# Patient Record
Sex: FEMALE | Race: WHITE | HISPANIC OR LATINO | Employment: UNEMPLOYED | URBAN - NONMETROPOLITAN AREA
[De-identification: names, ages, dates, MRNs, and addresses within clinical notes are randomized per-mention and may not be internally consistent; named-entity substitution may affect disease eponyms.]

---
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
## 2021-01-07 ENCOUNTER — ROUTINE PRENATAL (OUTPATIENT)
Dept: OBSTETRICS AND GYNECOLOGY | Facility: CLINIC | Age: 21
End: 2021-01-07

## 2021-01-07 VITALS — WEIGHT: 163 LBS | BODY MASS INDEX: 28.87 KG/M2 | DIASTOLIC BLOOD PRESSURE: 70 MMHG | SYSTOLIC BLOOD PRESSURE: 112 MMHG

## 2021-01-07 DIAGNOSIS — Z34.83 ENCOUNTER FOR SUPERVISION OF OTHER NORMAL PREGNANCY IN THIRD TRIMESTER: Primary | ICD-10-CM

## 2021-01-07 DIAGNOSIS — Z36.85 ANTENATAL SCREENING FOR STREPTOCOCCUS B: ICD-10-CM

## 2021-01-07 PROBLEM — O09.893 HIGH RISK TEEN PREGNANCY IN THIRD TRIMESTER: Status: ACTIVE | Noted: 2020-11-04

## 2021-01-07 PROCEDURE — 99212 OFFICE O/P EST SF 10 MIN: CPT | Performed by: MIDWIFE

## 2021-01-07 NOTE — PROGRESS NOTES
Chief Complaint   Patient presents with   • Routine Prenatal Visit     Patient c/o left side cramping in arm and leg for 2 weeks       HPI: Meg is a  currently at 36w0d who today reports the following:  Contractions - No; Leaking - No; Vaginal bleeding -  No; Swelling of extremities - No; Baby is active - YES   She has had some tingling sensations for brief periods in her left hand and foot. It usually resolves on its own after moving. She denies any neck pain or headaches.    ROS:   GI:   Nausea - No; Constipation - No   Neuro:  Headache - No; Visual disturbances - No.    Social History    Tobacco Use      Smoking status: Never Smoker      Smokeless tobacco: Never Used      EXAM:   Vitals:  See prenatal flowsheet, /70, Wt +3#   Abdomen:   See prenatal flowsheet as noted and reviewed, soft, nontender   Pelvic:  See prenatal flowsheet   Urine:  See prenatal flowsheet as noted and reviewed    MDM:  Impression: Supervision of low risk pregnancy   Tests done today: GBS testing   Topics discussed: kick counts and fetal movement  labor signs and symptoms   Tests next visit: none   Next visit: 1 week     This note was electronically signed.  America Michelle, APRN  2021

## 2021-01-09 LAB — GP B STREP DNA SPEC QL NAA+PROBE: NEGATIVE

## 2021-01-14 ENCOUNTER — ROUTINE PRENATAL (OUTPATIENT)
Dept: OBSTETRICS AND GYNECOLOGY | Facility: CLINIC | Age: 21
End: 2021-01-14

## 2021-01-14 VITALS — SYSTOLIC BLOOD PRESSURE: 116 MMHG | BODY MASS INDEX: 29.41 KG/M2 | WEIGHT: 166 LBS | DIASTOLIC BLOOD PRESSURE: 60 MMHG

## 2021-01-14 DIAGNOSIS — O09.93 ENCOUNTER FOR SUPERVISION OF HIGH RISK PREGNANCY IN THIRD TRIMESTER, ANTEPARTUM: Primary | ICD-10-CM

## 2021-01-14 DIAGNOSIS — O09.893 HIGH RISK TEEN PREGNANCY IN THIRD TRIMESTER: ICD-10-CM

## 2021-01-14 DIAGNOSIS — O47.9 IRREGULAR UTERINE CONTRACTIONS: ICD-10-CM

## 2021-01-14 PROCEDURE — 99213 OFFICE O/P EST LOW 20 MIN: CPT | Performed by: OBSTETRICS & GYNECOLOGY

## 2021-01-15 NOTE — PROGRESS NOTES
Chief Complaint  Routine Prenatal Visit (Patient complains of pressure. )    History of Present Illness:  Meg is a  currently at 37w0d who presents today with complaints of increasing pressure.  Patient reports having occasional contractions.  She denies any vaginal bleeding or spotting.  Patient denies any leaking of fluid.  She does report good fetal movement.  Patient did have her group B strep last visit.    Exam:  Vitals:  See prenatal flowsheet as noted and reviewed  General: Alert, cooperative, and does not appear in any distress  Abdomen:   See prenatal flowsheet as noted and reviewed    Uterus gravid, non-tender; no palpable masses    No guarding or rebound tenderness  Pelvic:  See prenatal flowsheet as noted and reviewed  Ext:  See prenatal flowsheet as noted and reviewed    Moves extremities well, no cyanosis and no redness  Urine:  See prenatal flowsheet as noted and reviewed    Data Review:  The following data was reviewed by: Divya Mena MD on 2021:  Prenatal Labs:  Lab Results   Component Value Date    HGB 12.0 2020    RUBELLAABIGG <0.90 (L) 2020    HEPBSAG Negative 2020    ABO O 2020    RH Positive 2020    ABSCRN See Final Results 2020    ABSCRN Comment 2020    [id number] Non Reactive 2020    HEPCVIRUSABY <0.1 2020    STREPGPB Negative 2021    URINECX Final report 2020       Routine Prenatal on 2021   Component Date Value   • Strep Gp B ANDERSON 2021 Negative      Imaging:  US Ob Follow Up Transabdominal Approach  Impression:   IUP at 32+0 weeks. Limited anatomy was reviewed today and is normal in   appearance. EFW 1875g(38%) AC 50%. Cephalic presentation. Placenta is   anterior. Amniotic fluid and fetal heart rate are normal.    Nba Call MD  Obstetrics and Gynecology  [facility]    Medical Records:  None    Assessment and Plan:  Problem List Items Addressed This Visit        Other    High  risk teen pregnancy in third trimester      Other Visit Diagnoses     Encounter for supervision of high risk pregnancy in third trimester, antepartum    -  Primary  Topics discussed:     induction of labor  kick counts and fetal movement  labor signs and symptoms  PIH precautions      Irregular uterine contractions      Patient with irregular uterine contractions as noted.  Labor instructions and precautions have been given.  Patient is to follow-up in 1 week.  Patient has been informed regarding her GBS.        Follow Up/Instructions:    Patient was given instructions and counseling regarding her condition or for health maintenance advice. Please see specific information pulled into the AVS if appropriate.     Note: Speech recognition transcription software may have been used to dictate portions of this document.  An attempt at proofreading has been made though minor errors in transcription may still be present.    This note was electronically signed.  Divya Mena M.D.

## 2021-01-18 ENCOUNTER — HOSPITAL ENCOUNTER (OUTPATIENT)
Facility: HOSPITAL | Age: 21
Discharge: HOME | End: 2021-01-18
Attending: MIDWIFE | Admitting: MIDWIFE

## 2021-01-18 VITALS
HEART RATE: 101 BPM | RESPIRATION RATE: 16 BRPM | OXYGEN SATURATION: 99 % | WEIGHT: 166 LBS | HEIGHT: 63 IN | TEMPERATURE: 98.8 F | BODY MASS INDEX: 29.41 KG/M2

## 2021-01-18 LAB
BILIRUB BLD-MCNC: NEGATIVE MG/DL
CLARITY, POC: CLEAR
COLOR UR: YELLOW
GLUCOSE UR STRIP-MCNC: NEGATIVE MG/DL
KETONES UR QL: NEGATIVE
LEUKOCYTE EST, POC: NEGATIVE
NITRITE UR-MCNC: NEGATIVE MG/ML
PH UR: 7 [PH] (ref 5–8)
PROT UR STRIP-MCNC: NEGATIVE MG/DL
RBC # UR STRIP: ABNORMAL /UL
SP GR UR: 1.01 (ref 1–1.03)
UROBILINOGEN UR QL: NORMAL

## 2021-01-18 PROCEDURE — 81002 URINALYSIS NONAUTO W/O SCOPE: CPT | Performed by: MIDWIFE

## 2021-01-18 PROCEDURE — 59025 FETAL NON-STRESS TEST: CPT | Performed by: MIDWIFE

## 2021-01-18 PROCEDURE — 59025 FETAL NON-STRESS TEST: CPT

## 2021-01-18 PROCEDURE — G0463 HOSPITAL OUTPT CLINIC VISIT: HCPCS

## 2021-01-19 ENCOUNTER — HOSPITAL ENCOUNTER (OUTPATIENT)
Facility: HOSPITAL | Age: 21
Discharge: HOME | End: 2021-01-19
Attending: NURSE PRACTITIONER | Admitting: NURSE PRACTITIONER

## 2021-01-19 VITALS
TEMPERATURE: 98.7 F | HEIGHT: 63 IN | HEART RATE: 93 BPM | SYSTOLIC BLOOD PRESSURE: 126 MMHG | BODY MASS INDEX: 29.41 KG/M2 | WEIGHT: 166 LBS | OXYGEN SATURATION: 100 % | DIASTOLIC BLOOD PRESSURE: 75 MMHG | RESPIRATION RATE: 18 BRPM

## 2021-01-19 LAB
BILIRUB BLD-MCNC: NEGATIVE MG/DL
CLARITY, POC: CLEAR
COLOR UR: YELLOW
GLUCOSE UR STRIP-MCNC: NEGATIVE MG/DL
KETONES UR QL: NEGATIVE
LEUKOCYTE EST, POC: NEGATIVE
NITRITE UR-MCNC: NEGATIVE MG/ML
PH UR: 7 [PH] (ref 5–8)
PROT UR STRIP-MCNC: ABNORMAL MG/DL
RBC # UR STRIP: ABNORMAL /UL
SP GR UR: 1.02 (ref 1–1.03)
UROBILINOGEN UR QL: NORMAL

## 2021-01-19 PROCEDURE — 81002 URINALYSIS NONAUTO W/O SCOPE: CPT | Performed by: NURSE PRACTITIONER

## 2021-01-19 PROCEDURE — 99213 OFFICE O/P EST LOW 20 MIN: CPT | Performed by: NURSE PRACTITIONER

## 2021-01-19 PROCEDURE — 59025 FETAL NON-STRESS TEST: CPT | Performed by: NURSE PRACTITIONER

## 2021-01-19 PROCEDURE — 59025 FETAL NON-STRESS TEST: CPT

## 2021-01-19 PROCEDURE — G0463 HOSPITAL OUTPT CLINIC VISIT: HCPCS

## 2021-01-19 NOTE — PROGRESS NOTES
"  : 2000  MRN: [id number]  CSN: [id number]    Chief Complaint:  Contractions   History and Physical    Subjective   Meg Mccrary is a 20 y.o. year old  with an Estimated Date of Delivery: 21 currently at 37w5d presenting with stating she was having some contractions - uncertain how frequent.   She denies vaginal bleeding or fluid leaking.  She also didn't feel she was feeling FM but does feel the baby move now  .    Upon arrival she was checked KIRIT Becker RN - 2/50%/-2    She was evaluated x 1 1/2 hrs.       OB History    Para Term  AB Living   2 1 1 0 0 1   SAB TAB Ectopic Molar Multiple Live Births   0 0 0 0 0 1      # Outcome Date GA Lbr Chalo/2nd Weight Sex Delivery Anes PTL Lv   2 Current            1 Term 18 39w2d 04:58 / 00:38 3147 g (6 lb 15 oz) F Vag-Spont None N ROSA      Name: MARTHA BAIN      Apgar1: 8  Apgar5: 9     Past Medical History:   Diagnosis Date   • Rubella non-immune status, antepartum 2020   • Urinary tract infection      No past surgical history on file.  No current facility-administered medications for this encounter.     Current Outpatient Medications:   •  influenza vac split quad (Flulaval Quadrivalent) 0.5 ML suspension prefilled syringe injection, Inject  into the appropriate muscle as directed by prescriber., Disp: 0.5 mL, Rfl: 0  •  Prenatal Vit-Fe Fumarate-FA (PRENATAL VITAMIN 28-0.8) 28-0.8 MG tablet tablet, Take 1 tablet by mouth Daily., Disp: 90 tablet, Rfl: 3    Allergies   Allergen Reactions   • Fish-Derived Products Rash     Social History    Tobacco Use      Smoking status: Never Smoker      Smokeless tobacco: Never Used      Review of Systems:  Pertinent items are noted in HPI, all other systems reviewed and negative        Objective   /75   Pulse 93   Temp 98.7 °F (37.1 °C) (Oral)   Resp 18   Ht 160 cm (63\")   Wt 75.3 kg (166 lb)   LMP 2020 (Approximate)   SpO2 100%   BMI 29.41 kg/m² "     Psych: Altert and oriented to time, place and person  Mood and affect appropriate   General: well developed; well nourished  no acute distress  Lungs:  breathing is unlabored  Abdomen: Gravid - soft and non-tender   Difficult to evaluate contractions per EFM -  + accels and variability   Lower Extremities: Neg edema and DTR's 2+   V/E: 2 /50%/-2 /cephalic cx post - no fluid leaking - no bloody show      Prenatal Labs  Lab Results   Component Value Date    HGB 12.0 11/04/2020    HEPBSAG Negative 06/25/2020    ABSCRN See Final Results 06/25/2020    ABSCRN Comment 06/25/2020    [id number] Non Reactive 06/25/2020    HEPCVIRUSABY <0.1 06/25/2020    STREPGPB Negative 01/07/2021    URINECX Final report 07/27/2020       Current Labs Reviewed   No data reviewed         Assessment   1. IUP at 37w5d  2. BH's contractions vs early labor  3. NST reactive        Plan   1. We discussed option to ambulate and sánchez in 1 hr   2. Reviewed S/S labor and adequate FM / kick counts and importance adequate FM   3. Desires d/c [place] - [facility] has an appt if office on Thursday    Fawn Clark CNM  1/19/2021  16:16 EST

## 2021-01-19 NOTE — NON STRESS TEST
Triage Note - Nursing Documentation  Labor and Delivery Admission Log    Meg Mccrary  : 2000  MRN: [id number]  CSN: [id number]    Date in / Time in:  2021  Time in: 1355    Date out / Time out:    Time out: 3709    Nurse: Lorrie Becker RN    Patient Info: She is a 20 y.o. year old  at 37w5d with an LEIDY of 2021, by Ultrasound who was seen on the [facility] [place].    Chief Complaint:   Chief Complaint   Patient presents with   • Decreased Fetal Movement   • Contractions       Provider Instructions / Disposition: To [place] with c/o contractions and decreased fetal movement. REACTIVE NST, fetal movement heard on monitor, palpated. CHUCKY ramos gave chose to leave and come back if worse or walk here and recheck later. Chose home. VE unchanged from yesterday . Irregular contraction pattern. Follow up in office on Thursday as scheduled    Patient Active Problem List   Diagnosis   • Rubella non-immune status, antepartum   • High risk teen pregnancy in third trimester       NST Documentation (Only applicable > 32 weeks): Interpretation A  Nonstress Test Interpretation A: Reactive (21 1548 : Lorrie Becker, RN)

## 2021-01-19 NOTE — NURSING NOTE
PT with c/o decreased fm, less than usual. EFM applied FM felt upon attachment twice. C/O contractions, VE 2/50/-2 white discharge on glove with exam

## 2021-01-21 ENCOUNTER — ROUTINE PRENATAL (OUTPATIENT)
Dept: OBSTETRICS AND GYNECOLOGY | Facility: CLINIC | Age: 21
End: 2021-01-21

## 2021-01-21 VITALS — DIASTOLIC BLOOD PRESSURE: 68 MMHG | WEIGHT: 166 LBS | SYSTOLIC BLOOD PRESSURE: 112 MMHG | BODY MASS INDEX: 29.41 KG/M2

## 2021-01-21 DIAGNOSIS — Z28.39 RUBELLA NON-IMMUNE STATUS, ANTEPARTUM: ICD-10-CM

## 2021-01-21 DIAGNOSIS — O09.893 HIGH RISK TEEN PREGNANCY IN THIRD TRIMESTER: Primary | ICD-10-CM

## 2021-01-21 DIAGNOSIS — O09.899 RUBELLA NON-IMMUNE STATUS, ANTEPARTUM: ICD-10-CM

## 2021-01-21 PROCEDURE — 99212 OFFICE O/P EST SF 10 MIN: CPT | Performed by: MIDWIFE

## 2021-01-21 NOTE — PROGRESS NOTES
Chief Complaint   Patient presents with   • Routine Prenatal Visit     Patient c/o nausea, pelvic pressure, lower abdominal and back pain       HPI: Meg is a  currently at 38w0d who today reports the following:  Contractions - YES - she went to  on Monday and Tuesday for labor check.; Swelling of extremities - No; Baby is active - YES    ROS:   Neuro:  Headache - No; Visual disturbances - No.    Social History    Tobacco Use      Smoking status: Never Smoker      Smokeless tobacco: Never Used      EXAM:   Vitals:  See prenatal flowsheet, /68, Wt no change   Abdomen:   See prenatal flowsheet as noted and reviewed, soft, nontender   Pelvic:  See prenatal flowsheet, tight 3/70/-2   Urine:  See prenatal flowsheet as noted and reviewed    MDM:  Impression: Supervision of low risk pregnancy   Tests done today: none   Topics discussed: kick counts and fetal movement  labor signs and symptoms   Tests next visit: none   Next visit: 1 week     This note was electronically signed.  America Michelle, APRN  2021

## 2021-01-25 ENCOUNTER — HOSPITAL ENCOUNTER (INPATIENT)
Facility: HOSPITAL | Age: 21
LOS: 2 days | Discharge: HOME | End: 2021-01-27
Attending: OBSTETRICS & GYNECOLOGY | Admitting: OBSTETRICS & GYNECOLOGY

## 2021-01-25 PROBLEM — Z34.90 PREGNANCY: Status: ACTIVE | Noted: 2021-01-25

## 2021-01-25 LAB
ABO GROUP BLD: NORMAL
BASOPHILS # BLD AUTO: 0.05 10*3/MM3 (ref 0–0.2)
BASOPHILS NFR BLD AUTO: 0.5 % (ref 0–1.5)
BILIRUB BLD-MCNC: NEGATIVE MG/DL
BLD GP AB SCN SERPL QL: NEGATIVE
CLARITY, POC: ABNORMAL
COLOR UR: YELLOW
DEPRECATED RDW RBC AUTO: 43.3 FL (ref 37–54)
EOSINOPHIL # BLD AUTO: 0.09 10*3/MM3 (ref 0–0.4)
EOSINOPHIL NFR BLD AUTO: 0.9 % (ref 0.3–6.2)
ERYTHROCYTE [DISTWIDTH] IN BLOOD BY AUTOMATED COUNT: 14.1 % (ref 12.3–15.4)
GLUCOSE UR STRIP-MCNC: NEGATIVE MG/DL
HCT VFR BLD AUTO: 39.7 % (ref 34–46.6)
HGB BLD-MCNC: 12.9 G/DL (ref 12–15.9)
IMM GRANULOCYTES # BLD AUTO: 0.17 10*3/MM3 (ref 0–0.05)
IMM GRANULOCYTES NFR BLD AUTO: 1.7 % (ref 0–0.5)
KETONES UR QL: NEGATIVE
LEUKOCYTE EST, POC: NEGATIVE
LYMPHOCYTES # BLD AUTO: 2.68 10*3/MM3 (ref 0.7–3.1)
LYMPHOCYTES NFR BLD AUTO: 27.2 % (ref 19.6–45.3)
MCH RBC QN AUTO: 27.7 PG (ref 26.6–33)
MCHC RBC AUTO-ENTMCNC: 32.5 G/DL (ref 31.5–35.7)
MCV RBC AUTO: 85.4 FL (ref 79–97)
MONOCYTES # BLD AUTO: 1.07 10*3/MM3 (ref 0.1–0.9)
MONOCYTES NFR BLD AUTO: 10.9 % (ref 5–12)
NEUTROPHILS NFR BLD AUTO: 5.79 10*3/MM3 (ref 1.7–7)
NEUTROPHILS NFR BLD AUTO: 58.8 % (ref 42.7–76)
NITRITE UR-MCNC: NEGATIVE MG/ML
NRBC BLD AUTO-RTO: 0 /100 WBC (ref 0–0.2)
PH UR: 8 [PH] (ref 5–8)
PLATELET # BLD AUTO: 285 10*3/MM3 (ref 140–450)
PMV BLD AUTO: 10.7 FL (ref 6–12)
PROT UR STRIP-MCNC: NEGATIVE MG/DL
RBC # BLD AUTO: 4.65 10*6/MM3 (ref 3.77–5.28)
RBC # UR STRIP: NEGATIVE /UL
RH BLD: POSITIVE
SP GR UR: 1.01 (ref 1–1.03)
T&S EXPIRATION DATE: NORMAL
UROBILINOGEN UR QL: NORMAL
WBC # BLD AUTO: 9.85 10*3/MM3 (ref 3.4–10.8)

## 2021-01-25 PROCEDURE — 86900 BLOOD TYPING SEROLOGIC ABO: CPT | Performed by: OBSTETRICS & GYNECOLOGY

## 2021-01-25 PROCEDURE — 25010000002 METHYLERGONOVINE MALEATE PER 0.2 MG: Performed by: OBSTETRICS & GYNECOLOGY

## 2021-01-25 PROCEDURE — 59410 OBSTETRICAL CARE: CPT | Performed by: OBSTETRICS & GYNECOLOGY

## 2021-01-25 PROCEDURE — 85025 COMPLETE CBC W/AUTO DIFF WBC: CPT | Performed by: OBSTETRICS & GYNECOLOGY

## 2021-01-25 PROCEDURE — 81002 URINALYSIS NONAUTO W/O SCOPE: CPT | Performed by: OBSTETRICS & GYNECOLOGY

## 2021-01-25 PROCEDURE — 86901 BLOOD TYPING SEROLOGIC RH(D): CPT | Performed by: OBSTETRICS & GYNECOLOGY

## 2021-01-25 PROCEDURE — 25010000002 MORPHINE PER 10 MG: Performed by: OBSTETRICS & GYNECOLOGY

## 2021-01-25 PROCEDURE — S0260 H&P FOR SURGERY: HCPCS | Performed by: OBSTETRICS & GYNECOLOGY

## 2021-01-25 PROCEDURE — 86850 RBC ANTIBODY SCREEN: CPT | Performed by: OBSTETRICS & GYNECOLOGY

## 2021-01-25 RX ORDER — SODIUM CHLORIDE 0.9 % (FLUSH) 0.9 %
1-10 SYRINGE (ML) INJECTION AS NEEDED
Status: DISCONTINUED | OUTPATIENT
Start: 2021-01-25 | End: 2021-01-27 | Stop reason: HOSPADM

## 2021-01-25 RX ORDER — MORPHINE SULFATE 2 MG/ML
6 INJECTION, SOLUTION INTRAMUSCULAR; INTRAVENOUS EVERY 4 HOURS PRN
Status: DISCONTINUED | OUTPATIENT
Start: 2021-01-25 | End: 2021-01-25 | Stop reason: HOSPADM

## 2021-01-25 RX ORDER — ONDANSETRON 2 MG/ML
4 INJECTION INTRAMUSCULAR; INTRAVENOUS EVERY 6 HOURS PRN
Status: DISCONTINUED | OUTPATIENT
Start: 2021-01-25 | End: 2021-01-27 | Stop reason: HOSPADM

## 2021-01-25 RX ORDER — PROMETHAZINE HYDROCHLORIDE 12.5 MG/1
12.5 SUPPOSITORY RECTAL EVERY 6 HOURS PRN
Status: DISCONTINUED | OUTPATIENT
Start: 2021-01-25 | End: 2021-01-27 | Stop reason: HOSPADM

## 2021-01-25 RX ORDER — OXYTOCIN/0.9 % SODIUM CHLORIDE 30/500 ML
650 PLASTIC BAG, INJECTION (ML) INTRAVENOUS ONCE
Status: COMPLETED | OUTPATIENT
Start: 2021-01-25 | End: 2021-01-25

## 2021-01-25 RX ORDER — HYDROCODONE BITARTRATE AND ACETAMINOPHEN 5; 325 MG/1; MG/1
2 TABLET ORAL ONCE AS NEEDED
Status: DISCONTINUED | OUTPATIENT
Start: 2021-01-25 | End: 2021-01-25 | Stop reason: HOSPADM

## 2021-01-25 RX ORDER — MORPHINE SULFATE 2 MG/ML
2 INJECTION, SOLUTION INTRAMUSCULAR; INTRAVENOUS ONCE AS NEEDED
Status: DISCONTINUED | OUTPATIENT
Start: 2021-01-25 | End: 2021-01-25 | Stop reason: HOSPADM

## 2021-01-25 RX ORDER — ONDANSETRON 4 MG/1
4 TABLET, FILM COATED ORAL ONCE AS NEEDED
Status: DISCONTINUED | OUTPATIENT
Start: 2021-01-25 | End: 2021-01-25 | Stop reason: HOSPADM

## 2021-01-25 RX ORDER — HYDROCODONE BITARTRATE AND ACETAMINOPHEN 5; 325 MG/1; MG/1
1 TABLET ORAL EVERY 4 HOURS PRN
Status: DISCONTINUED | OUTPATIENT
Start: 2021-01-25 | End: 2021-01-27 | Stop reason: HOSPADM

## 2021-01-25 RX ORDER — SODIUM CHLORIDE 0.9 % (FLUSH) 0.9 %
3 SYRINGE (ML) INJECTION EVERY 12 HOURS SCHEDULED
Status: DISCONTINUED | OUTPATIENT
Start: 2021-01-25 | End: 2021-01-25 | Stop reason: HOSPADM

## 2021-01-25 RX ORDER — CARBOPROST TROMETHAMINE 250 UG/ML
250 INJECTION, SOLUTION INTRAMUSCULAR AS NEEDED
Status: DISCONTINUED | OUTPATIENT
Start: 2021-01-25 | End: 2021-01-25 | Stop reason: HOSPADM

## 2021-01-25 RX ORDER — METHYLERGONOVINE MALEATE 0.2 MG/ML
200 INJECTION INTRAVENOUS ONCE AS NEEDED
Status: COMPLETED | OUTPATIENT
Start: 2021-01-25 | End: 2021-01-25

## 2021-01-25 RX ORDER — DOCUSATE SODIUM 100 MG/1
100 CAPSULE, LIQUID FILLED ORAL 2 TIMES DAILY
Status: DISCONTINUED | OUTPATIENT
Start: 2021-01-25 | End: 2021-01-27 | Stop reason: HOSPADM

## 2021-01-25 RX ORDER — OXYTOCIN/0.9 % SODIUM CHLORIDE 30/500 ML
85 PLASTIC BAG, INJECTION (ML) INTRAVENOUS ONCE
Status: COMPLETED | OUTPATIENT
Start: 2021-01-25 | End: 2021-01-25

## 2021-01-25 RX ORDER — SODIUM CHLORIDE, SODIUM LACTATE, POTASSIUM CHLORIDE, CALCIUM CHLORIDE 600; 310; 30; 20 MG/100ML; MG/100ML; MG/100ML; MG/100ML
125 INJECTION, SOLUTION INTRAVENOUS CONTINUOUS
Status: DISCONTINUED | OUTPATIENT
Start: 2021-01-25 | End: 2021-01-25

## 2021-01-25 RX ORDER — HYDROCODONE BITARTRATE AND ACETAMINOPHEN 7.5; 325 MG/1; MG/1
1 TABLET ORAL EVERY 4 HOURS PRN
Status: DISCONTINUED | OUTPATIENT
Start: 2021-01-25 | End: 2021-01-27 | Stop reason: HOSPADM

## 2021-01-25 RX ORDER — LIDOCAINE HYDROCHLORIDE 10 MG/ML
5 INJECTION, SOLUTION EPIDURAL; INFILTRATION; INTRACAUDAL; PERINEURAL AS NEEDED
Status: DISCONTINUED | OUTPATIENT
Start: 2021-01-25 | End: 2021-01-25 | Stop reason: HOSPADM

## 2021-01-25 RX ORDER — ONDANSETRON 4 MG/1
4 TABLET, FILM COATED ORAL EVERY 8 HOURS PRN
Status: DISCONTINUED | OUTPATIENT
Start: 2021-01-25 | End: 2021-01-27 | Stop reason: HOSPADM

## 2021-01-25 RX ORDER — DIPHENHYDRAMINE HCL 25 MG
25 CAPSULE ORAL NIGHTLY PRN
Status: DISCONTINUED | OUTPATIENT
Start: 2021-01-25 | End: 2021-01-27 | Stop reason: HOSPADM

## 2021-01-25 RX ORDER — LANOLIN
CREAM (GRAM) TOPICAL
Status: DISCONTINUED | OUTPATIENT
Start: 2021-01-25 | End: 2021-01-27 | Stop reason: HOSPADM

## 2021-01-25 RX ORDER — PROMETHAZINE HYDROCHLORIDE 25 MG/1
25 TABLET ORAL EVERY 6 HOURS PRN
Status: DISCONTINUED | OUTPATIENT
Start: 2021-01-25 | End: 2021-01-27 | Stop reason: HOSPADM

## 2021-01-25 RX ORDER — PROMETHAZINE HYDROCHLORIDE 12.5 MG/1
12.5 SUPPOSITORY RECTAL EVERY 6 HOURS PRN
Status: DISCONTINUED | OUTPATIENT
Start: 2021-01-25 | End: 2021-01-25 | Stop reason: HOSPADM

## 2021-01-25 RX ORDER — PRENATAL VIT/IRON FUM/FOLIC AC 27MG-0.8MG
1 TABLET ORAL DAILY
Status: DISCONTINUED | OUTPATIENT
Start: 2021-01-25 | End: 2021-01-27 | Stop reason: HOSPADM

## 2021-01-25 RX ORDER — MISOPROSTOL 200 UG/1
800 TABLET ORAL AS NEEDED
Status: DISCONTINUED | OUTPATIENT
Start: 2021-01-25 | End: 2021-01-25 | Stop reason: HOSPADM

## 2021-01-25 RX ORDER — ACETAMINOPHEN 325 MG/1
650 TABLET ORAL ONCE AS NEEDED
Status: DISCONTINUED | OUTPATIENT
Start: 2021-01-25 | End: 2021-01-25 | Stop reason: HOSPADM

## 2021-01-25 RX ORDER — ONDANSETRON 2 MG/ML
4 INJECTION INTRAMUSCULAR; INTRAVENOUS ONCE AS NEEDED
Status: DISCONTINUED | OUTPATIENT
Start: 2021-01-25 | End: 2021-01-25 | Stop reason: HOSPADM

## 2021-01-25 RX ORDER — PROMETHAZINE HYDROCHLORIDE 12.5 MG/1
12.5 TABLET ORAL EVERY 6 HOURS PRN
Status: DISCONTINUED | OUTPATIENT
Start: 2021-01-25 | End: 2021-01-25 | Stop reason: HOSPADM

## 2021-01-25 RX ORDER — MORPHINE SULFATE 4 MG/ML
4 INJECTION, SOLUTION INTRAMUSCULAR; INTRAVENOUS ONCE AS NEEDED
Status: DISCONTINUED | OUTPATIENT
Start: 2021-01-25 | End: 2021-01-25 | Stop reason: HOSPADM

## 2021-01-25 RX ORDER — HYDROCORTISONE 25 MG/G
1 CREAM TOPICAL AS NEEDED
Status: DISCONTINUED | OUTPATIENT
Start: 2021-01-25 | End: 2021-01-27 | Stop reason: HOSPADM

## 2021-01-25 RX ORDER — IBUPROFEN 600 MG/1
600 TABLET ORAL EVERY 6 HOURS PRN
Status: DISCONTINUED | OUTPATIENT
Start: 2021-01-25 | End: 2021-01-27 | Stop reason: HOSPADM

## 2021-01-25 RX ORDER — SODIUM CHLORIDE 0.9 % (FLUSH) 0.9 %
1-10 SYRINGE (ML) INJECTION AS NEEDED
Status: DISCONTINUED | OUTPATIENT
Start: 2021-01-25 | End: 2021-01-25 | Stop reason: HOSPADM

## 2021-01-25 RX ORDER — BISACODYL 10 MG
10 SUPPOSITORY, RECTAL RECTAL DAILY PRN
Status: DISCONTINUED | OUTPATIENT
Start: 2021-01-26 | End: 2021-01-27 | Stop reason: HOSPADM

## 2021-01-25 RX ORDER — MORPHINE SULFATE 4 MG/ML
4 INJECTION, SOLUTION INTRAMUSCULAR; INTRAVENOUS EVERY 4 HOURS PRN
Status: DISCONTINUED | OUTPATIENT
Start: 2021-01-25 | End: 2021-01-25 | Stop reason: HOSPADM

## 2021-01-25 RX ADMIN — Medication 650 ML/HR: at 06:41

## 2021-01-25 RX ADMIN — SODIUM CHLORIDE, POTASSIUM CHLORIDE, SODIUM LACTATE AND CALCIUM CHLORIDE 125 ML/HR: 600; 310; 30; 20 INJECTION, SOLUTION INTRAVENOUS at 03:50

## 2021-01-25 RX ADMIN — DOCUSATE SODIUM 100 MG: 100 CAPSULE ORAL at 10:54

## 2021-01-25 RX ADMIN — BENZOCAINE AND LEVOMENTHOL: 200; 5 SPRAY TOPICAL at 10:54

## 2021-01-25 RX ADMIN — PRENATAL VITAMINS-IRON FUMARATE 27 MG IRON-FOLIC ACID 0.8 MG TABLET 1 TABLET: at 10:54

## 2021-01-25 RX ADMIN — Medication 85 ML/HR: at 07:15

## 2021-01-25 RX ADMIN — MORPHINE SULFATE 4 MG: 4 INJECTION, SOLUTION INTRAMUSCULAR; INTRAVENOUS at 03:59

## 2021-01-25 RX ADMIN — METHYLERGONOVINE MALEATE 200 MCG: 0.2 INJECTION, SOLUTION INTRAMUSCULAR; INTRAVENOUS at 08:56

## 2021-01-25 RX ADMIN — IBUPROFEN 600 MG: 600 TABLET ORAL at 17:42

## 2021-01-25 RX ADMIN — DOCUSATE SODIUM 100 MG: 100 CAPSULE ORAL at 21:01

## 2021-01-26 LAB
BASOPHILS # BLD AUTO: 0.05 10*3/MM3 (ref 0–0.2)
BASOPHILS NFR BLD AUTO: 0.4 % (ref 0–1.5)
DEPRECATED RDW RBC AUTO: 42.5 FL (ref 37–54)
EOSINOPHIL # BLD AUTO: 0.11 10*3/MM3 (ref 0–0.4)
EOSINOPHIL NFR BLD AUTO: 1 % (ref 0.3–6.2)
ERYTHROCYTE [DISTWIDTH] IN BLOOD BY AUTOMATED COUNT: 13.9 % (ref 12.3–15.4)
HCT VFR BLD AUTO: 35.3 % (ref 34–46.6)
HGB BLD-MCNC: 11.4 G/DL (ref 12–15.9)
IMM GRANULOCYTES # BLD AUTO: 0.3 10*3/MM3 (ref 0–0.05)
IMM GRANULOCYTES NFR BLD AUTO: 2.7 % (ref 0–0.5)
LYMPHOCYTES # BLD AUTO: 3.17 10*3/MM3 (ref 0.7–3.1)
LYMPHOCYTES NFR BLD AUTO: 28.2 % (ref 19.6–45.3)
MCH RBC QN AUTO: 27.3 PG (ref 26.6–33)
MCHC RBC AUTO-ENTMCNC: 32.3 G/DL (ref 31.5–35.7)
MCV RBC AUTO: 84.7 FL (ref 79–97)
MONOCYTES # BLD AUTO: 1 10*3/MM3 (ref 0.1–0.9)
MONOCYTES NFR BLD AUTO: 8.9 % (ref 5–12)
NEUTROPHILS NFR BLD AUTO: 58.8 % (ref 42.7–76)
NEUTROPHILS NFR BLD AUTO: 6.61 10*3/MM3 (ref 1.7–7)
NRBC BLD AUTO-RTO: 0 /100 WBC (ref 0–0.2)
PLATELET # BLD AUTO: 251 10*3/MM3 (ref 140–450)
PMV BLD AUTO: 11 FL (ref 6–12)
RBC # BLD AUTO: 4.17 10*6/MM3 (ref 3.77–5.28)
WBC # BLD AUTO: 11.24 10*3/MM3 (ref 3.4–10.8)

## 2021-01-26 PROCEDURE — 85025 COMPLETE CBC W/AUTO DIFF WBC: CPT | Performed by: OBSTETRICS & GYNECOLOGY

## 2021-01-26 RX ADMIN — DOCUSATE SODIUM 100 MG: 100 CAPSULE ORAL at 08:30

## 2021-01-26 RX ADMIN — PRENATAL VITAMINS-IRON FUMARATE 27 MG IRON-FOLIC ACID 0.8 MG TABLET 1 TABLET: at 08:30

## 2021-01-26 RX ADMIN — IBUPROFEN 600 MG: 600 TABLET ORAL at 12:19

## 2021-01-26 RX ADMIN — IBUPROFEN 600 MG: 600 TABLET ORAL at 21:16

## 2021-01-26 RX ADMIN — DOCUSATE SODIUM 100 MG: 100 CAPSULE ORAL at 21:17

## 2021-01-27 VITALS
RESPIRATION RATE: 18 BRPM | WEIGHT: 166 LBS | HEIGHT: 63 IN | OXYGEN SATURATION: 97 % | BODY MASS INDEX: 29.41 KG/M2 | SYSTOLIC BLOOD PRESSURE: 101 MMHG | TEMPERATURE: 97.5 F | HEART RATE: 78 BPM | DIASTOLIC BLOOD PRESSURE: 53 MMHG

## 2021-01-27 PROCEDURE — 0503F POSTPARTUM CARE VISIT: CPT | Performed by: MIDWIFE

## 2021-01-27 RX ORDER — IBUPROFEN 600 MG/1
600 TABLET ORAL EVERY 6 HOURS PRN
Qty: 60 TABLET | Refills: 0 | Status: SHIPPED | OUTPATIENT
Start: 2021-01-27 | End: 2022-10-17

## 2021-01-27 RX ADMIN — DOCUSATE SODIUM 100 MG: 100 CAPSULE ORAL at 08:16

## 2021-01-27 RX ADMIN — PRENATAL VITAMINS-IRON FUMARATE 27 MG IRON-FOLIC ACID 0.8 MG TABLET 1 TABLET: at 08:16

## 2021-01-27 RX ADMIN — IBUPROFEN 600 MG: 600 TABLET ORAL at 08:16

## 2021-03-05 ENCOUNTER — POSTPARTUM VISIT (OUTPATIENT)
Dept: OBSTETRICS AND GYNECOLOGY | Facility: CLINIC | Age: 21
End: 2021-03-05

## 2021-03-05 VITALS
WEIGHT: 148.8 LBS | BODY MASS INDEX: 26.36 KG/M2 | SYSTOLIC BLOOD PRESSURE: 100 MMHG | DIASTOLIC BLOOD PRESSURE: 64 MMHG | HEIGHT: 63 IN

## 2021-03-05 DIAGNOSIS — Z30.011 ENCOUNTER FOR INITIAL PRESCRIPTION OF CONTRACEPTIVE PILLS: ICD-10-CM

## 2021-03-05 PROBLEM — Z34.90 PREGNANCY: Status: RESOLVED | Noted: 2021-01-25 | Resolved: 2021-03-05

## 2021-03-05 PROBLEM — Z28.39 RUBELLA NON-IMMUNE STATUS, ANTEPARTUM: Status: RESOLVED | Noted: 2020-06-25 | Resolved: 2021-03-05

## 2021-03-05 PROBLEM — O09.893 HIGH RISK TEEN PREGNANCY IN THIRD TRIMESTER: Status: RESOLVED | Noted: 2020-11-04 | Resolved: 2021-03-05

## 2021-03-05 PROBLEM — O09.899 RUBELLA NON-IMMUNE STATUS, ANTEPARTUM: Status: RESOLVED | Noted: 2020-06-25 | Resolved: 2021-03-05

## 2021-03-05 PROCEDURE — 0503F POSTPARTUM CARE VISIT: CPT | Performed by: MIDWIFE

## 2021-03-05 RX ORDER — ACETAMINOPHEN AND CODEINE PHOSPHATE 120; 12 MG/5ML; MG/5ML
1 SOLUTION ORAL DAILY
Qty: 28 TABLET | Refills: 12 | Status: SHIPPED | OUTPATIENT
Start: 2021-03-05 | End: 2021-03-05

## 2021-03-05 RX ORDER — ACETAMINOPHEN AND CODEINE PHOSPHATE 120; 12 MG/5ML; MG/5ML
1 SOLUTION ORAL DAILY
Qty: 28 TABLET | Refills: 12 | Status: SHIPPED | OUTPATIENT
Start: 2021-03-05 | End: 2021-05-07

## 2021-03-05 NOTE — PROGRESS NOTES
"Subjective   Chief Complaint   Patient presents with   • Follow-up     Patient is here for 6 week postpartum check.Wantine Nexplanon if Ins covers it or pills.     Meg Mccrary is a 20 y.o. year old  presenting to be seen for her postpartum visit.  She had a vaginal delivery.    Since delivery she has not been sexually active.   She does not have concerns about post-partum blues/depression.   She is Breast/Bottle Feeding: breast feeding .  For ongoing contraception, her plans are Nexplanon.  She has not had a menstrual cycle.    Social History    Tobacco Use      Smoking status: Never Smoker      Smokeless tobacco: Never Used         Objective   /64   Ht 160 cm (62.99\")   Wt 67.5 kg (148 lb 12.8 oz)   LMP 2020 (Approximate)   Breastfeeding Yes   BMI 26.37 kg/m²     General:  well developed; well nourished  no acute distress   Abdomen: soft, non-tender; no masses  no umbilical or inguinal hernias are present   Pelvis: External genitalia:  normal appearance of the external genitalia including Bartholin's and [place]'s glands.  Uterus:  normal size, shape and consistency.  Adnexa:  normal bimanual exam of the adnexa.        Assessment   1. Normal 6 week postpartum exam  2. Contraceptive management     Plan   1. BC options reviewed and compared today: Nexplanon and OCP (progestin only)  2. Pap smear not due  3. Follow up 1 year    New Medications Ordered This Visit   Medications   • norethindrone (Ortho Micronor) 0.35 MG tablet     Sig: Take 1 tablet by mouth Daily for 364 days.     Dispense:  28 tablet     Refill:  12          This note was electronically signed.  America Michelle, AMARJIT  3/5/2021  "

## 2021-05-05 ENCOUNTER — TELEPHONE (OUTPATIENT)
Dept: OBSTETRICS AND GYNECOLOGY | Facility: CLINIC | Age: 21
End: 2021-05-05

## 2021-05-07 RX ORDER — DESOGESTREL AND ETHINYL ESTRADIOL 0.15-0.03
1 KIT ORAL DAILY
Qty: 28 TABLET | Refills: 10 | Status: SHIPPED | OUTPATIENT
Start: 2021-05-07 | End: 2022-10-17

## 2022-03-23 ENCOUNTER — TELEPHONE (OUTPATIENT)
Dept: OBSTETRICS AND GYNECOLOGY | Facility: CLINIC | Age: 22
End: 2022-03-23

## 2022-03-23 NOTE — ED PROVIDER NOTES
Abdominal Pain, N/V/D Subjective   HPI Comments: Patient is here with description of some lower abdominal pains cramps that come and go intermittently for the past 3-4 weeks denies any unusual vaginal discharge denies fevers chills normal by mouth intake no anorexia no nausea no vomiting no diarrhea normal bowel movements are reported      Review of Systems   Constitutional: Negative.    HENT: Negative.    Eyes: Negative.    Respiratory: Negative.    Gastrointestinal: Negative for diarrhea, nausea and vomiting.        Lower abdominal cramps   Genitourinary: Negative for flank pain, menstrual problem, vaginal bleeding, vaginal discharge and vaginal pain.   Musculoskeletal: Negative.    Skin: Negative.    Neurological: Negative.    Psychiatric/Behavioral: Negative.    All other systems reviewed and are negative.      History reviewed. No pertinent past medical history.    No Known Allergies    History reviewed. No pertinent surgical history.    History reviewed. No pertinent family history.    Social History     Social History   • Marital status: Single     Spouse name: N/A   • Number of children: N/A   • Years of education: N/A     Social History Main Topics   • Smoking status: Never Smoker   • Smokeless tobacco: None   • Alcohol use None   • Drug use: None   • Sexual activity: Not Asked     Other Topics Concern   • None     Social History Narrative   • None           Objective   Physical Exam   Constitutional: She is oriented to person, place, and time. She appears well-developed and well-nourished.   Afebrile vital signs stable nontoxic no acute distress cheerful smiling when I enter the room   HENT:   Head: Normocephalic.   Eyes: Conjunctivae and EOM are normal. Pupils are equal, round, and reactive to light.   Neck: Normal range of motion.   Cardiovascular: Normal rate, regular rhythm, normal heart sounds and intact distal pulses.    Pulmonary/Chest: Effort normal and breath sounds normal.   Abdominal: Soft. Bowel sounds are normal.    Very minimal tenderness suprapubic certainly no rebound or guarding abdomen is soft benign   Neurological: She is alert and oriented to person, place, and time.   Skin: Skin is warm and dry. No rash noted.   Psychiatric: She has a normal mood and affect. Her behavior is normal. Judgment and thought content normal.   Nursing note and vitals reviewed.      Procedures         ED Course  ED Course   Comment By Time   Patient case labs management reviewed Dr Shaila Cuellar PA-C 08/25 1436   Ultrasound demonstrates a living IUP gestational age of 9 weeks fetal heart rate 185 Rob Cuellar PA-C 08/25 1519   Patient is here with her mother discussed with her and her mother follow-up with OB Dr. Poon's office call for appointment we'll start her prenatal vitamins instructions to return here if there are any problems, pain bleeding fever or any other concern Rob Cuellar PA-C 08/25 1527                  [facility]    Final diagnoses:   Intrauterine pregnancy            Rob Cuellar PA-C  08/25/17 1532

## 2022-08-23 ENCOUNTER — INITIAL PRENATAL (OUTPATIENT)
Dept: OBSTETRICS AND GYNECOLOGY | Facility: CLINIC | Age: 22
End: 2022-08-23

## 2022-08-23 VITALS — BODY MASS INDEX: 24.88 KG/M2 | DIASTOLIC BLOOD PRESSURE: 60 MMHG | SYSTOLIC BLOOD PRESSURE: 100 MMHG | WEIGHT: 140.4 LBS

## 2022-08-23 DIAGNOSIS — Z34.90 PREGNANCY, UNSPECIFIED GESTATIONAL AGE: ICD-10-CM

## 2022-08-23 DIAGNOSIS — O36.80X0 ENCOUNTER TO DETERMINE FETAL VIABILITY OF PREGNANCY, SINGLE OR UNSPECIFIED FETUS: Primary | ICD-10-CM

## 2022-08-23 PROCEDURE — 99213 OFFICE O/P EST LOW 20 MIN: CPT | Performed by: OBSTETRICS & GYNECOLOGY

## 2022-08-23 NOTE — PROGRESS NOTES
Subjective   Chief Complaint   Patient presents with   • Initial Prenatal Visit     NOB with TVS done today. Complains of some lower abdominal cramping, with no bleeding .     Meg Mccrary is a 21 y.o. year old .  No LMP recorded. Patient is pregnant.  She presents to be seen because of patient obstetrical care.  Patient's had 2 prior vaginal livery's at term.  Previous 6 pounds 15 ounces.  No issues with these pregnancies.  Patient is overall in good health..     OTHER COMPLAINTS:  Nothing else    The following portions of the patient's history were reviewed and updated as appropriate:  She  has a past medical history of Rubella non-immune status, antepartum (2020) and Urinary tract infection.  She does not have any pertinent problems on file.  She  has no past surgical history on file.  Her family history includes No Known Problems in her brother, daughter, father, maternal grandfather, maternal grandmother, mother, paternal grandfather, paternal grandmother, sister, and son.  She  reports that she has never smoked. She has never used smokeless tobacco. She reports that she does not drink alcohol and does not use drugs.  Current Outpatient Medications   Medication Sig Dispense Refill   • Prenatal Vit-Fe Fumarate-FA (PRENATAL VITAMIN 28-0.8) 28-0.8 MG tablet tablet Take 1 tablet by mouth Daily. 90 tablet 3   • desogestrel-ethinyl estradiol (APRI) 0.15-30 MG-MCG per tablet Take 1 tablet by mouth Daily for 308 days. 28 tablet 10   • ibuprofen (ADVIL,MOTRIN) 600 MG tablet Take 1 tablet by mouth Every 6 (Six) Hours As Needed for Mild or Moderate Pain . 60 tablet 0   • influenza vac split quad (Flulaval Quadrivalent) 0.5 ML suspension prefilled syringe injection Inject  into the appropriate muscle as directed by prescriber. 0.5 mL 0     No current facility-administered medications for this visit.     Current Outpatient Medications on File Prior to Visit   Medication Sig   • Prenatal Vit-Fe  Fumarate-FA (PRENATAL VITAMIN 28-0.8) 28-0.8 MG tablet tablet Take 1 tablet by mouth Daily.   • desogestrel-ethinyl estradiol (APRI) 0.15-30 MG-MCG per tablet Take 1 tablet by mouth Daily for 308 days.   • ibuprofen (ADVIL,MOTRIN) 600 MG tablet Take 1 tablet by mouth Every 6 (Six) Hours As Needed for Mild or Moderate Pain .   • influenza vac split quad (Flulaval Quadrivalent) 0.5 ML suspension prefilled syringe injection Inject  into the appropriate muscle as directed by prescriber.     No current facility-administered medications on file prior to visit.     She is allergic to fish-derived products.    Social History    Tobacco Use      Smoking status: Never Smoker      Smokeless tobacco: Never Used    Review of Systems  Consitutional POS: nothing reported    NEG: anorexia or night sweats   Gastointestinal POS: nothing reported    NEG: bloating, change in bowel habits, melena or reflux symptoms   Genitourinary POS: nothing reported    NEG: dysuria or hematuria   Integument POS: nothing reported    NEG: moles that are changing in size, shape, color or rashes   Breast POS: nothing reported    NEG: persistent breast lump, skin dimpling or nipple discharge         Respiratory: negative  Cardiovascular: negative          Objective   /60   Wt 63.7 kg (140 lb 6.4 oz)   BMI 24.88 kg/m²     General:  well developed; well nourished  no acute distress   Skin:  No suspicious lesions seen   Thyroid: normal to inspection and palpation   Lungs:  breathing is unlabored  clear to auscultation bilaterally   Heart:  regular rate and rhythm, S1, S2 normal, no murmur, click, rub or gallop   Breasts:  Examined in supine position  Symmetric without masses or skin dimpling  Nipples normal without inversion, lesions or discharge  There are no palpable axillary nodes   Abdomen: soft, non-tender; no masses  no umbilical or inguinal hernias are present  no hepato-splenomegaly   Pelvis: Clinical staff was present for exam  External  genitalia:  normal appearance of the external genitalia including Bartholin's and [place]'s glands.  :  urethral meatus normal;  Vaginal:  normal pink mucosa without prolapse or lesions.  Cervix:  normal appearance.  Uterus:  normal size, shape and consistency.  Adnexa:  normal bimanual exam of the adnexa.  Rectal:  digital rectal exam not performed; anus visually normal appearing.     Psychiatric: Alert and oriented ×3, mood and affect appropriate  HEENT: Atraumatic, normocephalic, normal scleral icterus  Extremities: 2+ pulses bilaterally, no edema      Lab Review   No data reviewed    Imaging   Pelvic ultrasound images independantly reviewed - Intrauterine pregnancy at 15 weeks and 2 days.  Size consistent with dates.  Limited anatomy is normal.  Vertex.  Posterior placenta.  Active fetus        Assessment   1. Intrauterine pregnancy at 15 weeks and a day  2. Prior vaginal livery term x2     Plan   1. Prenatal labs, maternity 21, vaginal cultures taken  2.     No orders of the defined types were placed in this encounter.         This note was electronically signed.      August 23, 2022

## 2022-08-27 LAB
5P15 DELETION (CRI-DU-CHAT): NEGATIVE
A VAGINAE DNA VAG QL NAA+PROBE: ABNORMAL SCORE
ABO GROUP BLD: ABNORMAL
BASOPHILS # BLD AUTO: 0 X10E3/UL (ref 0–0.2)
BASOPHILS NFR BLD AUTO: 0 %
BLD GP AB SCN SERPL QL: ABNORMAL
BLD GP AB SCN SERPL QL: NORMAL
BLOOD GROUP ANTIBODIES SERPL: NORMAL
BVAB2 DNA VAG QL NAA+PROBE: ABNORMAL SCORE
C ALBICANS DNA VAG QL NAA+PROBE: NEGATIVE
C GLABRATA DNA VAG QL NAA+PROBE: NEGATIVE
C TRACH DNA VAG QL NAA+PROBE: NEGATIVE
CFDNA.FET/CFDNA.TOTAL SFR FETUS: NORMAL %
CITATION REF LAB TEST: NORMAL
EOSINOPHIL # BLD AUTO: 0.1 X10E3/UL (ref 0–0.4)
EOSINOPHIL NFR BLD AUTO: 1 %
ERYTHROCYTE [DISTWIDTH] IN BLOOD BY AUTOMATED COUNT: 12.9 % (ref 11.7–15.4)
FET 13+18+21+X+Y ANEUP PLAS.CFDNA: NEGATIVE
FET 1P36 DEL RISK WBC.DNA+CFDNA QL: NEGATIVE
FET 22Q11.2 DEL RISK WBC.DNA+CFDNA QL: NEGATIVE
FET CHR 11Q23 DEL PLAS.CFDNA QL: NEGATIVE
FET CHR 15Q11 DEL PLAS.CFDNA QL: NEGATIVE
FET CHR 21 TS PLAS.CFDNA QL: NEGATIVE
FET CHR 4P16 DEL PLAS.CFDNA QL: NEGATIVE
FET CHR 8Q24 DEL PLAS.CFDNA QL: NEGATIVE
FET MS X RISK WBC.DNA+CFDNA QL: NEGATIVE
FET SEX PLAS.CFDNA DOSAGE CFDNA: NORMAL
FET TS 13 RISK PLAS.CFDNA QL: NEGATIVE
FET TS 18 RISK WBC.DNA+CFDNA QL: NEGATIVE
FET X + Y ANEUP RISK PLAS.CFDNA SEQ-IMP: NEGATIVE
GA EST FROM CONCEPTION DATE: NORMAL D
GAINS/LOSSES >=7 MB: NEGATIVE
GESTATIONAL AGE > 9:: YES
HBV SURFACE AG SERPL QL IA: NEGATIVE
HCT VFR BLD AUTO: 37.3 % (ref 34–46.6)
HCV AB S/CO SERPL IA: <0.1 S/CO RATIO (ref 0–0.9)
HGB BLD-MCNC: 12.3 G/DL (ref 11.1–15.9)
HIV 1+2 AB+HIV1 P24 AG SERPL QL IA: NON REACTIVE
IMM GRANULOCYTES # BLD AUTO: 0 X10E3/UL (ref 0–0.1)
IMM GRANULOCYTES NFR BLD AUTO: 0 %
LAB DIRECTOR NAME PROVIDER: NORMAL
LAB DIRECTOR NAME PROVIDER: NORMAL
LABORATORY COMMENT REPORT: NORMAL
LIMITATIONS OF THE TEST: NORMAL
LYMPHOCYTES # BLD AUTO: 1.7 X10E3/UL (ref 0.7–3.1)
LYMPHOCYTES NFR BLD AUTO: 31 %
MCH RBC QN AUTO: 28 PG (ref 26.6–33)
MCHC RBC AUTO-ENTMCNC: 33 G/DL (ref 31.5–35.7)
MCV RBC AUTO: 85 FL (ref 79–97)
MEGA1 DNA VAG QL NAA+PROBE: ABNORMAL SCORE
MONOCYTES # BLD AUTO: 0.6 X10E3/UL (ref 0.1–0.9)
MONOCYTES NFR BLD AUTO: 10 %
N GONORRHOEA DNA VAG QL NAA+PROBE: NEGATIVE
NEUTROPHILS # BLD AUTO: 3.3 X10E3/UL (ref 1.4–7)
NEUTROPHILS NFR BLD AUTO: 58 %
NOTE: NORMAL
OTHER AUTOSOMAL ANEUPLOIDIES: NEGATIVE
PERFORMANCE CHARACTERISTICS: NORMAL
PLATELET # BLD AUTO: 261 X10E3/UL (ref 150–450)
POSITIVE PREDICTIVE VALUE: NORMAL
RBC # BLD AUTO: 4.39 X10E6/UL (ref 3.77–5.28)
REF LAB TEST METHOD: NORMAL
RH BLD: POSITIVE
RPR SER QL: NON REACTIVE
RUBV IGG SERPL IA-ACNC: <0.9 INDEX
T VAGINALIS DNA VAG QL NAA+PROBE: NEGATIVE
TEST PERFORMANCE INFO SPEC: NORMAL
WBC # BLD AUTO: 5.6 X10E3/UL (ref 3.4–10.8)

## 2022-08-30 RX ORDER — METRONIDAZOLE 500 MG/1
500 TABLET ORAL 2 TIMES DAILY
Qty: 14 TABLET | Refills: 0 | Status: SHIPPED | OUTPATIENT
Start: 2022-08-30 | End: 2022-09-06

## 2022-08-30 NOTE — PROGRESS NOTES
Please let patient know labs look good though the vaginal cultures did show some bacterial vaginosis which is just really an imbalance.  Flagyl twice a day has been sent in for 7 days to restore the balance.  Genetic testing is normal/negative/male.  Thank you

## 2022-09-06 ENCOUNTER — REFERRAL TRIAGE (OUTPATIENT)
Dept: LABOR AND DELIVERY | Facility: HOSPITAL | Age: 22
End: 2022-09-06

## 2022-09-19 ENCOUNTER — ROUTINE PRENATAL (OUTPATIENT)
Dept: OBSTETRICS AND GYNECOLOGY | Facility: CLINIC | Age: 22
End: 2022-09-19

## 2022-09-19 VITALS — BODY MASS INDEX: 25.69 KG/M2 | WEIGHT: 145 LBS | DIASTOLIC BLOOD PRESSURE: 60 MMHG | SYSTOLIC BLOOD PRESSURE: 112 MMHG

## 2022-09-19 DIAGNOSIS — Z36.89 ENCOUNTER FOR FETAL ANATOMIC SURVEY: ICD-10-CM

## 2022-09-19 DIAGNOSIS — Z34.82 ENCOUNTER FOR SUPERVISION OF OTHER NORMAL PREGNANCY, SECOND TRIMESTER: Primary | ICD-10-CM

## 2022-09-19 DIAGNOSIS — O09.292 HISTORY OF MISCARRIAGE, CURRENTLY PREGNANT, SECOND TRIMESTER: ICD-10-CM

## 2022-09-19 PROCEDURE — 99213 OFFICE O/P EST LOW 20 MIN: CPT | Performed by: OBSTETRICS & GYNECOLOGY

## 2022-09-19 NOTE — PROGRESS NOTES
Chief Complaint  Routine Prenatal Visit (Anatomy scan today, no complaints. )    History of Present Illness:  Meg is a  currently at 19w1d who presents today with no complaints.  Patient does report positive fetal movement.  Patient has not had any vaginal bleeding or spotting.  Patient has been off work since July secondary to her visit.  Patient reports however she needs a note to return to work.  Patient works at [employer].  Patient did have a previous miscarriage earlier this year.  Patient reports she can do light duty.  She denies any cramping or contractions.  Patient did have previous genetic screening as noted.  Patient does desire MSAFP as well.    Exam:  Vitals:  See prenatal flowsheet as noted and reviewed  General: Alert, cooperative, and does not appear in any distress  Abdomen:   See prenatal flowsheet as noted and reviewed    Uterus gravid, non-tender; no palpable masses    No guarding or rebound tenderness  Pelvic:  See prenatal flowsheet as noted and reviewed  Ext:  See prenatal flowsheet as noted and reviewed    Moves extremities well, no cyanosis and no redness  Urine:  See prenatal flowsheet as noted and reviewed    Data Review:  The following data was reviewed by: Divya Mena MD on 2022:  Prenatal Labs:  Lab Results   Component Value Date    HGB 12.3 2022    RUBELLAABIGG <0.90 (L) 2022    HEPBSAG Negative 2022    ABO O 2022    RH Positive 2022    ABSCRN See Final Results 2022    ABSCRN Comment 2022    [id number] Non Reactive 2022    HEPCVIRUSABY <0.1 2022    STREPGPB Negative 2021    URINECX Final report 2020       Initial Prenatal on 2022   Component Date Value   • Atopobium Vaginae 2022 High - 2 (A)   • BVAB 2 2022 Low - 0    • Megasphaera 1 2022 High - 2 (A)   • Zahra Albicans, ANDERSON 2022 Negative    • Zahra Glabrata, ANDERSON 2022 Negative    • Trichomonas vaginosis 2022  Negative    • Chlamydia trachomatis, N* 08/23/2022 Negative    • Neisseria gonorrhoeae, N* 08/23/2022 Negative    • Hepatitis B Surface Ag 08/23/2022 Negative    • Hep C Virus Ab 08/23/2022 <0.1    • RPR 08/23/2022 Non Reactive    • Rubella Antibodies, IgG 08/23/2022 <0.90 (A)   • ABO Type 08/23/2022 O    • Rh Factor 08/23/2022 Positive    • Antibody Screen 08/23/2022 See Final Results    • HIV Screen 4th Gen w/RFX* 08/23/2022 Non Reactive    • WBC 08/23/2022 5.6    • RBC 08/23/2022 4.39    • Hemoglobin 08/23/2022 12.3    • Hematocrit 08/23/2022 37.3    • MCV 08/23/2022 85    • MCH 08/23/2022 28.0    • MCHC 08/23/2022 33.0    • RDW 08/23/2022 12.9    • Platelets 08/23/2022 261    • Neutrophil Rel % 08/23/2022 58    • Lymphocyte Rel % 08/23/2022 31    • Monocyte Rel % 08/23/2022 10    • Eosinophil Rel % 08/23/2022 1    • Basophil Rel % 08/23/2022 0    • Neutrophils Absolute 08/23/2022 3.3    • Lymphocytes Absolute 08/23/2022 1.7    • Monocytes Absolute 08/23/2022 0.6    • Eosinophils Absolute 08/23/2022 0.1    • Basophils Absolute 08/23/2022 0.0    • Immature Granulocyte Rel* 08/23/2022 0    • Immature Grans Absolute 08/23/2022 0.0    • Gestation 08/23/2022 Comment    • Fetal Fraction 08/23/2022 10%    • Gestational Age >9: 08/23/2022 Yes    • Result 08/23/2022 Negative    •  Comments 08/23/2022 Comment    • Approved By 08/23/2022 Comment    • TRISOMY 21 (DOWN SYNDROM* 08/23/2022 Negative    • TRISOMY 18 (CASTAÑEDA SYND* 08/23/2022 Negative    • TRISOMY 13 (PATAU SYNDRO* 08/23/2022 Negative    • Other autosomal aneuploi* 08/23/2022 Negative    • FETAL SEX 08/23/2022 Comment    • MONOSOMY X (ESCALANTE SYNDR* 08/23/2022 Negative    • XYY (BATISTA SYNDROME) 08/23/2022 Negative    • XXY (KLINEFELTER SYNDROM* 08/23/2022 Negative    • XXX (TRIPLE X SYNDROME) 08/23/2022 Negative    • Gains/Losses >=7 Mb 08/23/2022 Negative    • 22Q11 DELETION (DIGEORGE) 08/23/2022 Negative    • 15Q11 DELETION (PW MARGIE* 08/23/2022  Negative    • 11Q23 DELETION (JAUN) 2022 Negative    • 8Q24 DELETION (EVELINE-GI* 2022 Negative    • 5P15 DELETION (Cri-du-ch* 2022 Negative    • 4P16 DELETION (BLUNT-HIRS* 2022 Negative    • 1P36 DELETION SYNDROME 2022 Negative    • POSITIVE PREDICTIVE VALUE 2022 N/A    • About The Test 2022 Comment    • Test Method 2022 Comment    • Performance 2022 Comment    • PERFORMANCE CHARACTERIST* 2022 Note    • Limitations of the Test 2022 Comment    • Note 2022 Comment    • References 2022 Comment    • Antibody Screen 2022 Comment    • Antibody Id. #1 2022 CANCELED      Imaging:  US Ob 14 + Weeks Single or First Gestation  Meg Mccrary  : 2000  MRN: [id number]  Date: 2022    Reason for exam/History:  Anatomic Survey    Ultrasound images are reviewed.  There is noted to be a viable   intrauterine pregnancy.   The pregnancy is measuring 19 weeks 0 days   gestation.  The fetal heart rate was normal.  Normal anatomy was noted.   The placental location was noted to be posterior.  The amniotic fluid was   normal.    The exam limitations noted:  none    See the official report for actual measurements and structures seen.    Divya Mena MD, MS  [facility]  OB GYN [place]     Medical Records:  None    Assessment and Plan:  Problem List Items Addressed This Visit    None     Visit Diagnoses     Encounter for supervision of other normal pregnancy, second trimester    -  Primary  Topics discussed:     ab precautions  kick counts and fetal movement  PIH precautions  Anatomic scan today as noted.  Genetic screening discussed.  Patient does desire MSAFP.  Patient is to call for the results.    Encounter for fetal anatomic survey        Relevant Orders    US Ob 14 + Weeks Single or First Gestation (Completed)    Alpha Fetoprotein, Maternal    History of miscarriage, currently pregnant, second trimester       Note is given May return to work with sedentary duties.  AB precautions and instructions have been given.  Patient is to follow-up as discussed.        Follow Up/Instructions:  Follow up as scheduled.  Patient was given instructions and counseling regarding her condition or for health maintenance advice. Please see specific information pulled into the AVS if appropriate.     Note: Speech recognition transcription software may have been used to dictate portions of this document.  An attempt at proofreading has been made though minor errors in transcription may still be present.    This note was electronically signed.  Divya Mena M.D.

## 2022-09-23 LAB
AFP INTERP SERPL-IMP: NORMAL
AFP INTERP SERPL-IMP: NORMAL
AFP MOM SERPL: 0.97
AFP SERPL-MCNC: 52.2 NG/ML
AGE AT DELIVERY: 21.4 YR
GA METHOD: NORMAL
GA: 19.1 WEEKS
IDDM PATIENT QL: NO
LABORATORY COMMENT REPORT: NORMAL
MULTIPLE PREGNANCY: NO
NEURAL TUBE DEFECT RISK FETUS: NORMAL %
RESULT: NORMAL

## 2022-10-12 ENCOUNTER — HOSPITAL ENCOUNTER (OUTPATIENT)
Facility: HOSPITAL | Age: 22
Discharge: HOME | End: 2022-10-12
Attending: OBSTETRICS & GYNECOLOGY | Admitting: OBSTETRICS & GYNECOLOGY

## 2022-10-12 VITALS
TEMPERATURE: 98.5 F | OXYGEN SATURATION: 96 % | DIASTOLIC BLOOD PRESSURE: 64 MMHG | RESPIRATION RATE: 16 BRPM | HEIGHT: 62 IN | HEART RATE: 92 BPM | SYSTOLIC BLOOD PRESSURE: 99 MMHG | BODY MASS INDEX: 28.16 KG/M2 | WEIGHT: 153 LBS

## 2022-10-12 LAB
BILIRUB BLD-MCNC: NEGATIVE MG/DL
CLARITY, POC: CLEAR
COLOR UR: YELLOW
GLUCOSE UR STRIP-MCNC: NEGATIVE MG/DL
KETONES UR QL: NEGATIVE
LEUKOCYTE EST, POC: NEGATIVE
NITRITE UR-MCNC: NEGATIVE MG/ML
PH UR: 6 [PH] (ref 5–8)
PROT UR STRIP-MCNC: NEGATIVE MG/DL
RBC # UR STRIP: NEGATIVE /UL
SP GR UR: 1.03 (ref 1–1.03)
UROBILINOGEN UR QL: NORMAL

## 2022-10-12 PROCEDURE — 81002 URINALYSIS NONAUTO W/O SCOPE: CPT | Performed by: OBSTETRICS & GYNECOLOGY

## 2022-10-12 PROCEDURE — 99214 OFFICE O/P EST MOD 30 MIN: CPT | Performed by: OBSTETRICS & GYNECOLOGY

## 2022-10-12 PROCEDURE — G0463 HOSPITAL OUTPT CLINIC VISIT: HCPCS

## 2022-10-12 NOTE — NON STRESS TEST
"Triage Note - Nursing Documentation  Labor and Delivery Admission Log    Meg Mccrary  : 2000  MRN: [id number]  CSN: [id number]    Date in / Time in:  10/12/2022  Time in:1049  Date out / Time out:    Time out: 1220    Nurse: Lakisha Becker RN    Patient Info: She is a 22 y.o. year old  at 22w3d with an LEIDY of 2023, by Ultrasound who was seen on the [facility] [place].    Chief Complaint:   Chief Complaint   Patient presents with   • Abdominal Cramping     \"Pressure in lower abd and back\"       Provider Instructions / Disposition: Pt was PO hydrated and seen by Dr watkins. Pt discharge home with instructions to return to labor [place] with any changes.       Patient Active Problem List   Diagnosis   (none) - all problems resolved or deleted       NST Documentation (Only applicable > 32 weeks):    "

## 2022-10-13 NOTE — H&P
LAUREL Young  Meg Mccrary  : 2000  MRN: [id number]  CSN: [id number]    History and Physical  Meg Mccrary is a 22 y.o. year old  with an Estimated Date of Delivery: 23 currently at 22w3d presenting with complaints of pressure and lower abdominal cramping.  Patient also with low back pain.  Pt was at work.  She has been doing light duty at [employer].  She was at work this am with onset of symptoms.  She denies any bleeding or spotting.  She has had recent intercourse as well.  Patient denies any dysuria, frequency, flank pain, fever or chills.    Prenatal care has been with [facility].  It has been benign.  Pt did have a miscarriage earlier this year.  She had two previous term pregnancies.    History  OB History    Para Term  AB Living   4 2 2 0 1 2   SAB IAB Ectopic Molar Multiple Live Births   1 0 0 0 0 2      # Outcome Date GA Lbr Chalo/2nd Weight Sex Delivery Anes PTL Lv   4 Current            3 SAB 22           2 Term 22 38w4d / 00:01 3147 g (6 lb 15 oz) M Vag-Spont None N ROSA      Name: LISA BAIN      Apgar1: 8  Apgar5: 9   1 Term 18 39w2d 04:58 / 00:38 3147 g (6 lb 15 oz) F Vag-Spont None N ROSA      Name: MARTHA BAIN      Apgar1: 8  Apgar5: 9     Past Medical History:   Diagnosis Date   • Rubella non-immune status, antepartum 2020   • Urinary tract infection      No current facility-administered medications on file prior to encounter.     Current Outpatient Medications on File Prior to Encounter   Medication Sig Dispense Refill   • Prenatal Vit-Fe Fumarate-FA (PRENATAL VITAMIN 28-0.8) 28-0.8 MG tablet tablet Take 1 tablet by mouth Daily. 90 tablet 3   • desogestrel-ethinyl estradiol (APRI) 0.15-30 MG-MCG per tablet Take 1 tablet by mouth Daily for 308 days. 28 tablet 10   • ibuprofen (ADVIL,MOTRIN) 600 MG tablet Take 1 tablet by mouth Every 6 (Six) Hours As Needed for Mild or Moderate Pain . 60 tablet  "0   • influenza vac split quad (Flulaval Quadrivalent) 0.5 ML suspension prefilled syringe injection Inject  into the appropriate muscle as directed by prescriber. 0.5 mL 0     Allergies   Allergen Reactions   • Fish-Derived Products Rash     History reviewed. No pertinent surgical history.  Family History   Problem Relation Age of Onset   • Miscarriages / Stillbirths Mother    • Asthma Mother    • No Known Problems Father    • No Known Problems Sister    • No Known Problems Brother    • No Known Problems Daughter    • No Known Problems Son    • No Known Problems Maternal Grandmother    • No Known Problems Maternal Grandfather    • No Known Problems Paternal Grandmother    • No Known Problems Paternal Grandfather      Social History     Socioeconomic History   • Marital status:    Tobacco Use   • Smoking status: Never   • Smokeless tobacco: Never   Vaping Use   • Vaping Use: Never used   Substance and Sexual Activity   • Alcohol use: No   • Drug use: No   • Sexual activity: Yes     Partners: Male     Review of Systems  All systems were reviewed and negative except for:  Gastrointestinal: postitive for  pain  Genitourinary: postivie for  cramping    Objective  Vitals:    10/12/22 1110   BP: 99/64   BP Location: Right arm   Patient Position: Lying   Pulse: 92   Resp: 16   Temp: 98.5 °F (36.9 °C)   TempSrc: Oral   SpO2: 96%   Weight: 69.4 kg (153 lb)   Height: 157.5 cm (62\")     Physical Exam:  General Appearance:  Alert and cooperative, not in any acute distress.   Head:  Atraumatic and normocephalic, without obvious abnormality.   Eyes:          PERRLA, conjunctivae and sclerae normal, no Icterus. No pallor. Extraocular movements are within normal limits.   Ears:  Ears appear intact with no abnormalities noted.   Throat: No oral lesions, no thrush, oral mucosa moist.   Neck: Supple, trachea midline, no thyromegaly, no carotid bruit.   Back:   No kyphoscoliosis present. No tenderness to palpation,   range of " motion normal.  No CVAT.   Lungs:   Chest shape is normal. Breath sounds heard bilaterally equally.  No crackles or wheezing. No Pleural rub or bronchial breathing. Normal respiratory effort.   Heart:  Normal S1 and S2, no murmur, no gallop, no rub. No JVD.   Abdomen:   Normal bowel sounds, no masses, no hepatomegaly, no splenomegaly. Soft, non-tender, no guarding, no rebound tenderness.  Gravid uterus.   Extremities: Moves all extremities well, no edema, no cyanosis, no clubbing.  Normal range of motion. Normal strength and tone.   Skin: No bleeding, bruising or rash. No palpable masses noted or induration.   Psychiatric: Alert and oriented  to time, place, and person. Appropriate mood and affect. Thought content organized and appropriate judgment.       FHT's: reassuring and category 1   Contractions: occ   Cervix: was checked (by me): 0 cm / thick % / Floating           Prenatal Labs  Lab Results   Component Value Date    HGB 12.3 08/23/2022    HEPBSAG Negative 08/23/2022    ABSCRN See Final Results 08/23/2022    ABSCRN Comment 08/23/2022    [id number] Non Reactive 08/23/2022    HEPCVIRUSABY <0.1 08/23/2022    URINECX Final report 07/27/2020       Current Labs Reviewed   I reviewed the patient's new clinical results.  Lab Results (last 24 hours)     Procedure Component Value Units Date/Time    POC Urinalysis Dipstick [[id number]]  (Normal) Collected: 10/12/22 1107    Specimen: Urine Updated: 10/12/22 1108     Color Yellow     Clarity, UA Clear     Glucose, UA Negative mg/dL      Bilirubin Negative     Ketones, UA Negative     Specific Gravity  1.030     Blood, UA Negative     pH, Urine 6.0     Protein, POC Negative mg/dL      Urobilinogen, UA Normal     Leukocytes Negative     Nitrite, UA Negative             Assessment   1. IUP at 22w3d  2. Low back pain  3. Cramping/abdominal pain  4. History of recent miscarriage     Plan   1. Patient observed and given po hydration.  Cervix closed/thick.    2. Follow up  instructions given; pt to f/u in office with CL as discussed.  3. Discharge instructions and precautions given.  Recommend pelvic rest.  Recommend modified bedrest.  Note given off work until follow up.  Pt to increase po fluids as well.    Divya Mena M.D.  10/13/2022  07:37 EDT

## 2022-10-17 ENCOUNTER — ROUTINE PRENATAL (OUTPATIENT)
Dept: OBSTETRICS AND GYNECOLOGY | Facility: CLINIC | Age: 22
End: 2022-10-17

## 2022-10-17 VITALS — DIASTOLIC BLOOD PRESSURE: 74 MMHG | WEIGHT: 149 LBS | SYSTOLIC BLOOD PRESSURE: 118 MMHG | BODY MASS INDEX: 27.25 KG/M2

## 2022-10-17 DIAGNOSIS — Z34.82 ENCOUNTER FOR SUPERVISION OF OTHER NORMAL PREGNANCY IN SECOND TRIMESTER: ICD-10-CM

## 2022-10-17 DIAGNOSIS — O47.00 PRETERM UTERINE CONTRACTIONS, ANTEPARTUM: Primary | ICD-10-CM

## 2022-10-17 PROCEDURE — 99213 OFFICE O/P EST LOW 20 MIN: CPT | Performed by: MIDWIFE

## 2022-10-17 NOTE — PROGRESS NOTES
Chief Complaint   Patient presents with   • Routine Prenatal Visit     Was seen in labor [place] last week for  contractions        HPI: Meg is a  currently at 23w1d here for prenatal visit who reports the following:  Baby is active. She went to  last week for  ctxs. She was monitored overnight and sent home on modified bedrest. She has been off work ([employer]). She states she continues to have intermittent mild cramping. She states it worsens with increased activity.                EXAM:     Vitals:    10/17/22 1331   BP: 118/74      Abdomen:   See prenatal flowsheet as noted and reviewed, soft, nontender   Pelvic:  See prenatal flowsheet as noted and reviewed   Urine:  See prenatal flowsheet as noted and reviewed    Lab Results   Component Value Date    ABO O 2022    RH Positive 2022    ABSCRN See Final Results 2022    ABSCRN Comment 2022       MDM:  Impression:  contractions   Tests done today: none   Topics discussed: kick counts and fetal movement   labor signs and symptoms   Off work until next visit  Reviewed OB labs   Tests next visit: GCT  HgB  U/S for cervical length                RTO:                        1 weeks    This note was electronically signed.  America Michelle, APRN  10/17/2022

## 2022-10-25 ENCOUNTER — ROUTINE PRENATAL (OUTPATIENT)
Dept: OBSTETRICS AND GYNECOLOGY | Facility: CLINIC | Age: 22
End: 2022-10-25

## 2022-10-25 VITALS — WEIGHT: 152 LBS | BODY MASS INDEX: 27.8 KG/M2 | SYSTOLIC BLOOD PRESSURE: 116 MMHG | DIASTOLIC BLOOD PRESSURE: 60 MMHG

## 2022-10-25 DIAGNOSIS — O09.292 HISTORY OF MISCARRIAGE, CURRENTLY PREGNANT, SECOND TRIMESTER: ICD-10-CM

## 2022-10-25 DIAGNOSIS — O47.00 PRETERM UTERINE CONTRACTIONS, ANTEPARTUM: ICD-10-CM

## 2022-10-25 DIAGNOSIS — Z3A.24 24 WEEKS GESTATION OF PREGNANCY: ICD-10-CM

## 2022-10-25 DIAGNOSIS — Z34.82 ENCOUNTER FOR SUPERVISION OF OTHER NORMAL PREGNANCY IN SECOND TRIMESTER: Primary | ICD-10-CM

## 2022-10-25 LAB
BASOPHILS # BLD AUTO: 0.02 10*3/MM3 (ref 0–0.2)
BASOPHILS NFR BLD AUTO: 0.3 % (ref 0–1.5)
EOSINOPHIL # BLD AUTO: 0.06 10*3/MM3 (ref 0–0.4)
EOSINOPHIL NFR BLD AUTO: 0.9 % (ref 0.3–6.2)
ERYTHROCYTE [DISTWIDTH] IN BLOOD BY AUTOMATED COUNT: 13.2 % (ref 12.3–15.4)
GLUCOSE 1H P 50 G GLC PO SERPL-MCNC: 65 MG/DL (ref 65–139)
HCT VFR BLD AUTO: 34.6 % (ref 34–46.6)
HGB BLD-MCNC: 11.5 G/DL (ref 12–15.9)
IMM GRANULOCYTES # BLD AUTO: 0.1 10*3/MM3 (ref 0–0.05)
IMM GRANULOCYTES NFR BLD AUTO: 1.6 % (ref 0–0.5)
LYMPHOCYTES # BLD AUTO: 2.08 10*3/MM3 (ref 0.7–3.1)
LYMPHOCYTES NFR BLD AUTO: 32.9 % (ref 19.6–45.3)
MCH RBC QN AUTO: 28.4 PG (ref 26.6–33)
MCHC RBC AUTO-ENTMCNC: 33.2 G/DL (ref 31.5–35.7)
MCV RBC AUTO: 85.4 FL (ref 79–97)
MONOCYTES # BLD AUTO: 0.64 10*3/MM3 (ref 0.1–0.9)
MONOCYTES NFR BLD AUTO: 10.1 % (ref 5–12)
NEUTROPHILS # BLD AUTO: 3.42 10*3/MM3 (ref 1.7–7)
NEUTROPHILS NFR BLD AUTO: 54.2 % (ref 42.7–76)
NRBC BLD AUTO-RTO: 0 /100 WBC (ref 0–0.2)
PLATELET # BLD AUTO: 258 10*3/MM3 (ref 140–450)
RBC # BLD AUTO: 4.05 10*6/MM3 (ref 3.77–5.28)
WBC # BLD AUTO: 6.32 10*3/MM3 (ref 3.4–10.8)

## 2022-10-25 PROCEDURE — 99213 OFFICE O/P EST LOW 20 MIN: CPT | Performed by: OBSTETRICS & GYNECOLOGY

## 2022-10-25 NOTE — PROGRESS NOTES
Chief Complaint  Routine Prenatal Visit (Glucola and cervical length today, no complaints. )    History of Present Illness:  Meg is a  currently at 24w2d who presents today with complaints of continued irregular contractions.  Patient reports if she is up for 4 hours or more she has irregular contractions.  Patient has stayed off work since her last visit.  She reports at the most she has been having 2-3 contractions per hour.  She denies any vaginal bleeding or spotting.  She does report good fetal movement.  Cervical length is obtained today as noted.    Exam:  Vitals:  See prenatal flowsheet as noted and reviewed  General: Alert, cooperative, and does not appear in any distress  Abdomen:   See prenatal flowsheet as noted and reviewed    Uterus gravid, non-tender; no palpable masses    No guarding or rebound tenderness  Pelvic:  See prenatal flowsheet as noted and reviewed  Ext:  See prenatal flowsheet as noted and reviewed    Moves extremities well, no cyanosis and no redness  Urine:  See prenatal flowsheet as noted and reviewed    Data Review:  The following data was reviewed by: Divya Mena MD on 10/25/2022:  Prenatal Labs:  Lab Results   Component Value Date    HGB 12.3 2022    RUBELLAABIGG <0.90 (L) 2022    HEPBSAG Negative 2022    ABO O 2022    RH Positive 2022    ABSCRN See Final Results 2022    ABSCRN Comment 2022    [id number] Non Reactive 2022    HEPCVIRUSABY <0.1 2022    STREPGPB Negative 2021    URINECX Final report 2020       Admission on 10/12/2022, Discharged on 10/12/2022   Component Date Value   • Color 10/12/2022 Yellow    • Clarity, UA 10/12/2022 Clear    • Glucose, UA 10/12/2022 Negative    • Bilirubin 10/12/2022 Negative    • Ketones, UA 10/12/2022 Negative    • Specific Gravity  10/12/2022 1.030    • Blood, UA 10/12/2022 Negative    • pH, Urine 10/12/2022 6.0    • Protein, POC 10/12/2022 Negative    • Urobilinogen, UA  10/12/2022 Normal    • Leukocytes 10/12/2022 Negative    • Nitrite, UA 10/12/2022 Negative      Imaging:  US Ob Transvaginal  Meg Mccrary  : 2000  MRN: [id number]  Date: 10/25/2022    Reason for exam/History:  Cramping,  contractions    Ultrasound images are reviewed.  There is noted to be a viable   intrauterine pregnancy.   The fetal heart rate was normal.   The fetus was   noted to be active.  The cervical length was noted to be 5.03 cms.    The exam limitations noted:  none    See ultrasound report for measurements and structures identified.    Divya Mena MD, [facility]  OB GYN [place]     Medical Records:  None    Assessment and Plan:  Problem List Items Addressed This Visit    None  Visit Diagnoses     Encounter for supervision of other normal pregnancy in second trimester    -  Primary  Topics discussed:     kick counts and fetal movement  PIH precautions   labor signs and symptoms  Note is given to stay off work for remainder of pregnancy.   labor precautions and instructions have been given.  Patient has been informed and pelvic rest as well as modified bedrest.  Labs today as noted.    Relevant Orders    Gestational Screen 1 Hr ([facility])    CBC & Differential    24 weeks gestation of pregnancy        Relevant Orders    Gestational Screen 1 Hr ([facility])    CBC & Differential     uterine contractions, antepartum      Cervical length is obtained today.  Patient has been reassured regarding those findings.  Instructions and precautions have been given.    History of miscarriage, currently pregnant, second trimester            Follow Up/Instructions:  Follow up as scheduled.  Patient was given instructions and counseling regarding her condition or for health maintenance advice. Please see specific information pulled into the AVS if appropriate.     Note: Speech recognition transcription software may have been used to dictate portions of this  document.  An attempt at proofreading has been made though minor errors in transcription may still be present.    This note was electronically signed.  Divya Mena M.D.

## 2022-10-26 RX ORDER — FERROUS SULFATE 325(65) MG
325 TABLET ORAL
Qty: 30 TABLET | Refills: 5 | Status: SHIPPED | OUTPATIENT
Start: 2022-10-26 | End: 2022-11-25

## 2022-11-08 ENCOUNTER — ROUTINE PRENATAL (OUTPATIENT)
Dept: OBSTETRICS AND GYNECOLOGY | Facility: CLINIC | Age: 22
End: 2022-11-08

## 2022-11-08 VITALS — DIASTOLIC BLOOD PRESSURE: 56 MMHG | WEIGHT: 158 LBS | BODY MASS INDEX: 28.9 KG/M2 | SYSTOLIC BLOOD PRESSURE: 100 MMHG

## 2022-11-08 DIAGNOSIS — Z34.82 ENCOUNTER FOR SUPERVISION OF OTHER NORMAL PREGNANCY IN SECOND TRIMESTER: Primary | ICD-10-CM

## 2022-11-08 DIAGNOSIS — O47.00 PRETERM UTERINE CONTRACTIONS, ANTEPARTUM: ICD-10-CM

## 2022-11-08 PROCEDURE — 99213 OFFICE O/P EST LOW 20 MIN: CPT | Performed by: STUDENT IN AN ORGANIZED HEALTH CARE EDUCATION/TRAINING PROGRAM

## 2022-11-08 NOTE — PROGRESS NOTES
Prenatal Care Visit    Subjective   Chief Complaint   Patient presents with   • Routine Prenatal Visit     Patient states she feels that she may have some contractions periodically. Patient states she is having some mucus discharge. Has not had any gushes of fluids.     History:   Meg is a  currently at 26w2d who presents for a prenatal care visit today.    Reports intermittent CTX that resolve with rest. Denies VB, LOF. Reports good FM.     Objective   /56   Wt 71.7 kg (158 lb)   BMI 28.90 kg/m²   Physical Exam:  Normal, gestational age-appropriate exam today      Assessment & Plan     1. IUP @ 26w2d  2. Routine care: I have reviewed the prenatal labs and ultrasound(s) today. I have reviewed the most recent prenatal progress note(s).   3. Anemia: pt having GI upset with iron supplement, discussed adding iron rich foods to diet  4.  CTX: previously seen on LH and was closed. Encouraged rest/ hydration as needed.     Diagnosis Plan   1. Encounter for supervision of other normal pregnancy in second trimester        2.  uterine contractions, antepartum           Medication Management: can decrease iron to once daily    Topics discussed: Prenatal care milestones  Iron supplementation  Kick counts and fetal movement   labor signs and symptoms   Tests next visit: none   Next visit: 4 week(s)     Kelsey Roper MD  Obstetrics and Gynecology  [facility]

## 2022-12-05 ENCOUNTER — ROUTINE PRENATAL (OUTPATIENT)
Dept: OBSTETRICS AND GYNECOLOGY | Facility: CLINIC | Age: 22
End: 2022-12-05

## 2022-12-05 VITALS — BODY MASS INDEX: 29.63 KG/M2 | DIASTOLIC BLOOD PRESSURE: 68 MMHG | WEIGHT: 162 LBS | SYSTOLIC BLOOD PRESSURE: 110 MMHG

## 2022-12-05 DIAGNOSIS — Z34.83 ENCOUNTER FOR SUPERVISION OF OTHER NORMAL PREGNANCY IN THIRD TRIMESTER: Primary | ICD-10-CM

## 2022-12-05 PROCEDURE — 99213 OFFICE O/P EST LOW 20 MIN: CPT | Performed by: MIDWIFE

## 2022-12-05 NOTE — PROGRESS NOTES
Chief Complaint   Patient presents with   • Routine Prenatal Visit     On and off contractions       HPI: Meg is a  currently at 30w1d here for prenatal visit who reports the following:  Baby is active. She has irregular ctxs that usually resolve with rest.                EXAM:     Vitals:    22 1318   BP: 110/68      Abdomen:   See prenatal flowsheet as noted and reviewed, soft, nontender   Pelvic:  See prenatal flowsheet as noted and reviewed   Urine:  See prenatal flowsheet as noted and reviewed    Lab Results   Component Value Date    ABO O 2022    RH Positive 2022    ABSCRN See Final Results 2022    ABSCRN Comment 2022       MDM:  Impression: Supervision of low risk pregnancy   Tests done today: none   Topics discussed: kick counts and fetal movement   labor signs and symptoms  Reviewed OB labs   Tests next visit: U/S for EFW                RTO:                        2 weeks    This note was electronically signed.  America Michelle, APRN  2022

## 2022-12-10 ENCOUNTER — HOSPITAL ENCOUNTER (OUTPATIENT)
Facility: HOSPITAL | Age: 22
End: 2022-12-10
Attending: STUDENT IN AN ORGANIZED HEALTH CARE EDUCATION/TRAINING PROGRAM | Admitting: STUDENT IN AN ORGANIZED HEALTH CARE EDUCATION/TRAINING PROGRAM

## 2022-12-10 ENCOUNTER — HOSPITAL ENCOUNTER (OUTPATIENT)
Facility: HOSPITAL | Age: 22
Discharge: HOME | End: 2022-12-10
Attending: STUDENT IN AN ORGANIZED HEALTH CARE EDUCATION/TRAINING PROGRAM | Admitting: STUDENT IN AN ORGANIZED HEALTH CARE EDUCATION/TRAINING PROGRAM

## 2022-12-10 VITALS
BODY MASS INDEX: 30.46 KG/M2 | RESPIRATION RATE: 18 BRPM | TEMPERATURE: 98.3 F | OXYGEN SATURATION: 98 % | SYSTOLIC BLOOD PRESSURE: 104 MMHG | HEIGHT: 62 IN | DIASTOLIC BLOOD PRESSURE: 64 MMHG | HEART RATE: 84 BPM | WEIGHT: 165.5 LBS

## 2022-12-10 LAB
BILIRUB BLD-MCNC: NEGATIVE MG/DL
CLARITY, POC: CLEAR
COLOR UR: YELLOW
GLUCOSE UR STRIP-MCNC: NEGATIVE MG/DL
KETONES UR QL: NEGATIVE
LEUKOCYTE EST, POC: ABNORMAL
NITRITE UR-MCNC: NEGATIVE MG/ML
PH UR: 7 [PH] (ref 5–8)
PROT UR STRIP-MCNC: NEGATIVE MG/DL
RBC # UR STRIP: NEGATIVE /UL
SP GR UR: 1.01 (ref 1–1.03)
UROBILINOGEN UR QL: NORMAL

## 2022-12-10 PROCEDURE — G0463 HOSPITAL OUTPT CLINIC VISIT: HCPCS

## 2022-12-10 PROCEDURE — 81002 URINALYSIS NONAUTO W/O SCOPE: CPT | Performed by: STUDENT IN AN ORGANIZED HEALTH CARE EDUCATION/TRAINING PROGRAM

## 2022-12-19 ENCOUNTER — ROUTINE PRENATAL (OUTPATIENT)
Dept: OBSTETRICS AND GYNECOLOGY | Facility: CLINIC | Age: 22
End: 2022-12-19

## 2022-12-19 VITALS — WEIGHT: 164 LBS | DIASTOLIC BLOOD PRESSURE: 60 MMHG | SYSTOLIC BLOOD PRESSURE: 104 MMHG | BODY MASS INDEX: 30 KG/M2

## 2022-12-19 DIAGNOSIS — Z34.93 PRENATAL CARE IN THIRD TRIMESTER: Primary | ICD-10-CM

## 2022-12-19 PROCEDURE — 99213 OFFICE O/P EST LOW 20 MIN: CPT | Performed by: OBSTETRICS & GYNECOLOGY

## 2022-12-19 NOTE — PROGRESS NOTES
Prenatal Care Visit    Subjective   Chief Complaint   Patient presents with   • Routine Prenatal Visit     No complaints, growth scan done today       History:   Meg is a  currently at 32w1d who presents for a prenatal care visit today.    No major issues today.    Social History    Tobacco Use      Smoking status: Never      Smokeless tobacco: Never       Objective   /60   Wt 74.4 kg (164 lb)   BMI 30.00 kg/m²   Physical Exam:  Normal, gestational age-appropriate exam today        Plan   Medical Decision Making:    I have reviewed the prenatal labs and ultrasound(s) today. I have reviewed the most recent prenatal progress note(s).    Diagnosis: Supervision of low risk pregnancy   Tests/Orders/Rx today: No orders of the defined types were placed in this encounter.      Medication Management: None     Topics discussed: Prenatal care milestones  kick counts and fetal movement  PIH precautions   labor signs and symptoms   Tests next visit: none   Next visit: 2 week(s)     Nba Call MD  Obstetrics and Gynecology  [facility]

## 2022-12-22 NOTE — NON STRESS TEST
Triage Note - Nursing Documentation  Labor and Delivery Admission Log    Meg Mccrary  : 2000  MRN: [id number]  CSN: [id number]    Date in / Time in:  12/10/2022  Time in: 1103    Date out / Time out:  12/10/2022  Time out: 1230    Nurse: Chandni Dugan RN    Patient Info: She is a 22 y.o. year old  at 30w6d with an LEIDY of 2023, by Ultrasound who was seen on the [facility] [place].    Chief Complaint:   Chief Complaint   Patient presents with   • Decreased Fetal Movement       Provider Instructions / Disposition: discharge home, instructed on fetal kick counts, reasons to return to labor [place]. Follow with OB office.      Patient Active Problem List   Diagnosis   (none) - all problems resolved or deleted       NST Documentation (Only applicable > 32 weeks):

## 2023-01-05 ENCOUNTER — ROUTINE PRENATAL (OUTPATIENT)
Dept: OBSTETRICS AND GYNECOLOGY | Facility: CLINIC | Age: 23
End: 2023-01-05
Payer: MEDICAID

## 2023-01-05 VITALS — WEIGHT: 169.8 LBS | BODY MASS INDEX: 31.06 KG/M2 | SYSTOLIC BLOOD PRESSURE: 110 MMHG | DIASTOLIC BLOOD PRESSURE: 60 MMHG

## 2023-01-05 DIAGNOSIS — Z34.93 THIRD TRIMESTER PREGNANCY: Primary | ICD-10-CM

## 2023-01-05 PROCEDURE — 99213 OFFICE O/P EST LOW 20 MIN: CPT | Performed by: OBSTETRICS & GYNECOLOGY

## 2023-01-05 NOTE — PROGRESS NOTES
Chief Complaint   Patient presents with   • Routine Prenatal Visit     Prenatal visit with no problems or concerns        HPI:   , 34w4d gestation reports doing well    ROS:  See Prenatal Episode/Flowsheet  /60   Wt 77 kg (169 lb 12.8 oz)   BMI 31.06 kg/m²      EXAM:  EXTREMITIES:  No swelling-See Prenatal Episode/Flowsheet    ABDOMEN:  FHTs/Movement noted-See Prenatal Episode/Flowsheet    URINE GLUCOSE/PROTEIN:  See Prenatal Episode/Flowsheet    PELVIC EXAM:  See Prenatal Episode/Flowsheet  CV:  Lungs:  GYN:    MDM:    Lab Results   Component Value Date    HGB 11.5 (L) 10/25/2022    RUBELLAABIGG <0.90 (L) 2022    HEPBSAG Negative 2022    ABO O 2022    RH Positive 2022    ABSCRN See Final Results 2022    ABSCRN Comment 2022    [id number] Non Reactive 2022    HEPCVIRUSABY <0.1 2022    STREPGPB Negative 2021    URINECX Final report 2020       U/S:US Ob Follow Up Transabdominal Approach (2022 14:16)      1. IUP 34w4d  2. Routine care   3. GBs next time  4. CBC today

## 2023-01-06 LAB
ERYTHROCYTE [DISTWIDTH] IN BLOOD BY AUTOMATED COUNT: 13.7 % (ref 12.3–15.4)
HCT VFR BLD AUTO: 34.2 % (ref 34–46.6)
HGB BLD-MCNC: 11.8 G/DL (ref 12–15.9)
MCH RBC QN AUTO: 28.2 PG (ref 26.6–33)
MCHC RBC AUTO-ENTMCNC: 34.5 G/DL (ref 31.5–35.7)
MCV RBC AUTO: 81.6 FL (ref 79–97)
PLATELET # BLD AUTO: 243 10*3/MM3 (ref 140–450)
RBC # BLD AUTO: 4.19 10*6/MM3 (ref 3.77–5.28)
WBC # BLD AUTO: 7.31 10*3/MM3 (ref 3.4–10.8)

## 2023-01-16 ENCOUNTER — ROUTINE PRENATAL (OUTPATIENT)
Dept: OBSTETRICS AND GYNECOLOGY | Facility: CLINIC | Age: 23
End: 2023-01-16
Payer: MEDICAID

## 2023-01-16 VITALS — BODY MASS INDEX: 31.83 KG/M2 | WEIGHT: 174 LBS | SYSTOLIC BLOOD PRESSURE: 108 MMHG | DIASTOLIC BLOOD PRESSURE: 66 MMHG

## 2023-01-16 DIAGNOSIS — Z34.93 PRENATAL CARE IN THIRD TRIMESTER: Primary | ICD-10-CM

## 2023-01-16 DIAGNOSIS — Z36.85 ANTENATAL SCREENING FOR STREPTOCOCCUS B: ICD-10-CM

## 2023-01-16 PROCEDURE — 99213 OFFICE O/P EST LOW 20 MIN: CPT | Performed by: OBSTETRICS & GYNECOLOGY

## 2023-01-16 NOTE — PROGRESS NOTES
Prenatal Care Visit    Subjective   Chief Complaint   Patient presents with   • Routine Prenatal Visit     GBS, armani [facility]er[facility]        History:   Meg is a  currently at 36w1d who presents for a prenatal care visit today.    No major issues today.    Social History    Tobacco Use      Smoking status: Never      Smokeless tobacco: Never       Objective   /66   Wt 78.9 kg (174 lb)   BMI 31.83 kg/m²   Physical Exam:  Normal, gestational age-appropriate exam today        Plan   Medical Decision Making:    I have reviewed the prenatal labs and ultrasound(s) today. I have reviewed the most recent prenatal progress note(s).    Diagnosis: Supervision of low risk pregnancy   Tests/Orders/Rx today: Orders Placed This Encounter   Procedures   • Strep Grp B ANDERSON + Reflex - Swab, Vaginal/Rectum     Order Specific Question:   Release to patient     Answer:   Routine Release       Medication Management: None     Topics discussed: Prenatal care milestones  induction of labor  kick counts and fetal movement  labor signs and symptoms  PIH precautions   Tests next visit: none   Next visit: 1 week(s)     Nba Call MD  Obstetrics and Gynecology  [facility]

## 2023-01-18 LAB — GP B STREP DNA SPEC QL NAA+PROBE: NEGATIVE

## 2023-01-23 ENCOUNTER — HOSPITAL ENCOUNTER (OUTPATIENT)
Facility: HOSPITAL | Age: 23
Discharge: HOME | End: 2023-01-23
Attending: OBSTETRICS & GYNECOLOGY | Admitting: OBSTETRICS & GYNECOLOGY
Payer: MEDICAID

## 2023-01-23 ENCOUNTER — ROUTINE PRENATAL (OUTPATIENT)
Dept: OBSTETRICS AND GYNECOLOGY | Facility: CLINIC | Age: 23
End: 2023-01-23
Payer: MEDICAID

## 2023-01-23 VITALS
WEIGHT: 177.8 LBS | HEART RATE: 91 BPM | RESPIRATION RATE: 18 BRPM | SYSTOLIC BLOOD PRESSURE: 104 MMHG | DIASTOLIC BLOOD PRESSURE: 53 MMHG | OXYGEN SATURATION: 96 % | BODY MASS INDEX: 32.72 KG/M2 | TEMPERATURE: 98.1 F | HEIGHT: 62 IN

## 2023-01-23 VITALS — SYSTOLIC BLOOD PRESSURE: 112 MMHG | WEIGHT: 177 LBS | BODY MASS INDEX: 32.37 KG/M2 | DIASTOLIC BLOOD PRESSURE: 70 MMHG

## 2023-01-23 DIAGNOSIS — Z34.83 ENCOUNTER FOR SUPERVISION OF OTHER NORMAL PREGNANCY IN THIRD TRIMESTER: Primary | ICD-10-CM

## 2023-01-23 DIAGNOSIS — O36.8130 DECREASED FETAL MOVEMENTS IN THIRD TRIMESTER, SINGLE OR UNSPECIFIED FETUS: ICD-10-CM

## 2023-01-23 PROCEDURE — 99213 OFFICE O/P EST LOW 20 MIN: CPT | Performed by: MIDWIFE

## 2023-01-23 PROCEDURE — 59025 FETAL NON-STRESS TEST: CPT

## 2023-01-23 PROCEDURE — G0463 HOSPITAL OUTPT CLINIC VISIT: HCPCS

## 2023-01-23 NOTE — NON STRESS TEST
Triage Note - Nursing Documentation  Labor and Delivery Admission Log    Meg Mccrary  : 2000  MRN: [id number]  CSN: [id number]    Date in / Time in:  2023  Time in:     Date out / Time out:    Time out:     Nurse: Arleen Martinez, RN    Patient Info: She is a 22 y.o. year old  at 37w1d with an LEIDY of 2023, by Ultrasound who was seen on the [facility] [place].    Chief Complaint:   Chief Complaint   Patient presents with   • Decreased Fetal Movement     Started yesterday around 12 he stopped moving likes he normally does       Provider Instructions / Disposition: Do NST, do SVE. If no dilation, send pt home.  SVE cl/th/high. Discharge instructions given.    Patient Active Problem List   Diagnosis   (none) - all problems resolved or deleted       NST Documentation (Only applicable > 32 weeks): Interpretation A  Nonstress Test Interpretation A: Reactive (23 : Arleen Martinez, RN)

## 2023-01-23 NOTE — PROGRESS NOTES
Chief Complaint   Patient presents with   • Routine Prenatal Visit     Concerned with decrease fetal movement        HPI: Meg is a  currently at 37w1d here for prenatal visit who reports the following:  Baby has decreased movement. She states she feels movement but not vigorous kicks. She has occasional ctx.                EXAM:     Vitals:    23 1129   BP: 112/70      Abdomen:   See prenatal flowsheet as noted and reviewed, soft, nontender   Pelvic:  See prenatal flowsheet as noted and reviewed   Urine:  See prenatal flowsheet as noted and reviewed    Lab Results   Component Value Date    ABO O 2022    RH Positive 2022    ABSCRN See Final Results 2022    ABSCRN Comment 2022       MDM:  Impression: Decreased fetal movement   Tests done today: NST-states she can go later today   Topics discussed: kick counts and fetal movement  labor signs and symptoms  Reviewed OB labs   Tests next visit: none                RTO:                        1 weeks    This note was electronically signed.  America Michelle, APRN  2023

## 2023-01-30 ENCOUNTER — ROUTINE PRENATAL (OUTPATIENT)
Dept: OBSTETRICS AND GYNECOLOGY | Facility: CLINIC | Age: 23
End: 2023-01-30
Payer: MEDICAID

## 2023-01-30 VITALS — SYSTOLIC BLOOD PRESSURE: 104 MMHG | WEIGHT: 177.2 LBS | BODY MASS INDEX: 32.41 KG/M2 | DIASTOLIC BLOOD PRESSURE: 60 MMHG

## 2023-01-30 DIAGNOSIS — Z34.93 THIRD TRIMESTER PREGNANCY: Primary | ICD-10-CM

## 2023-01-30 PROCEDURE — 99213 OFFICE O/P EST LOW 20 MIN: CPT | Performed by: OBSTETRICS & GYNECOLOGY

## 2023-01-30 NOTE — PROGRESS NOTES
Chief Complaint   Patient presents with   • Routine Prenatal Visit     Prenatal visit with Exam today. No problems or concerns.        HPI:   , 38w1d gestation reports doing well    ROS:  See Prenatal Episode/Flowsheet  /60   Wt 80.4 kg (177 lb 3.2 oz)   BMI 32.41 kg/m²      EXAM:  EXTREMITIES:  No swelling-See Prenatal Episode/Flowsheet    ABDOMEN:  FHTs/Movement noted-See Prenatal Episode/Flowsheet    URINE GLUCOSE/PROTEIN:  See Prenatal Episode/Flowsheet    PELVIC EXAM:  See Prenatal Episode/Flowsheet  CV:  Lungs:  GYN:    MDM:    Lab Results   Component Value Date    HGB 11.8 (L) 2023    RUBELLAABIGG <0.90 (L) 2022    HEPBSAG Negative 2022    ABO O 2022    RH Positive 2022    ABSCRN See Final Results 2022    ABSCRN Comment 2022    [id number] Non Reactive 2022    HEPCVIRUSABY <0.1 2022    STREPGPB Negative 2023    URINECX Final report 2020       U/S:US Ob Follow Up Transabdominal Approach (2022 14:16)      1. IUP 38w1d  2. Routine care   3. Cerivx closed thinning, head engaged

## 2023-02-03 ENCOUNTER — HOSPITAL ENCOUNTER (OUTPATIENT)
Facility: HOSPITAL | Age: 23
Discharge: HOME | End: 2023-02-03
Attending: OBSTETRICS & GYNECOLOGY | Admitting: MIDWIFE
Payer: MEDICAID

## 2023-02-03 VITALS
WEIGHT: 178.3 LBS | DIASTOLIC BLOOD PRESSURE: 62 MMHG | HEART RATE: 87 BPM | HEIGHT: 62 IN | TEMPERATURE: 98.5 F | OXYGEN SATURATION: 98 % | RESPIRATION RATE: 18 BRPM | SYSTOLIC BLOOD PRESSURE: 105 MMHG | BODY MASS INDEX: 32.81 KG/M2

## 2023-02-03 LAB
BILIRUB BLD-MCNC: NEGATIVE MG/DL
CLARITY, POC: CLEAR
COLOR UR: YELLOW
GLUCOSE UR STRIP-MCNC: NEGATIVE MG/DL
KETONES UR QL: NEGATIVE
LEUKOCYTE EST, POC: NEGATIVE
NITRITE UR-MCNC: NEGATIVE MG/ML
PH UR: 6.5 [PH] (ref 5–8)
PROT UR STRIP-MCNC: NEGATIVE MG/DL
RBC # UR STRIP: ABNORMAL /UL
SP GR UR: 1 (ref 1–1.03)
UROBILINOGEN UR QL: NORMAL

## 2023-02-03 PROCEDURE — 59025 FETAL NON-STRESS TEST: CPT | Performed by: MIDWIFE

## 2023-02-03 PROCEDURE — G0463 HOSPITAL OUTPT CLINIC VISIT: HCPCS

## 2023-02-03 PROCEDURE — 81002 URINALYSIS NONAUTO W/O SCOPE: CPT | Performed by: MIDWIFE

## 2023-02-03 PROCEDURE — 59025 FETAL NON-STRESS TEST: CPT

## 2023-02-03 NOTE — NON STRESS TEST
Triage Note - Nursing Documentation  Labor and Delivery Admission Log    Meg Mccrary  : 2000  MRN: [id number]  CSN: [id number]    Date in / Time in:  2/3/2023  Time in: 716    Date out / Time out:    Time out: 931    Nurse: Maldonado Simon RN    Patient Info: She is a 22 y.o. year old  at 38w5d with an LEIDY of 2023, by Ultrasound who was seen on the [facility] [place].    Chief Complaint:   Chief Complaint   Patient presents with   • Contractions     Started 7pm 2023       Provider Instructions / Disposition: Patient presented in [place] with reports of contractions, which she stated started yesterday in the evening around 1900.  Denies having had any leaking of fluid.  VSS.  Was lexi irregularly.  Vaginal exam 1-2/50-60%/-2, when she arrived and no change in approx 2 hours.  Pt to discharge home.  Will keep regular appt and return if needed.    Patient Active Problem List   Diagnosis   (none) - all problems resolved or deleted       NST Documentation (Only applicable > 32 weeks): Interpretation A  Nonstress Test Interpretation A: Reactive (23 1012 : Maldonado Simon, RN)

## 2023-02-05 ENCOUNTER — HOSPITAL ENCOUNTER (OUTPATIENT)
Facility: HOSPITAL | Age: 23
Discharge: HOME | End: 2023-02-05
Attending: MIDWIFE | Admitting: MIDWIFE
Payer: MEDICAID

## 2023-02-05 VITALS — SYSTOLIC BLOOD PRESSURE: 106 MMHG | HEART RATE: 87 BPM | DIASTOLIC BLOOD PRESSURE: 61 MMHG

## 2023-02-05 LAB
BILIRUB BLD-MCNC: NEGATIVE MG/DL
CLARITY, POC: CLEAR
COLOR UR: YELLOW
GLUCOSE UR STRIP-MCNC: NEGATIVE MG/DL
KETONES UR QL: NEGATIVE
LEUKOCYTE EST, POC: NEGATIVE
NITRITE UR-MCNC: NEGATIVE MG/ML
PH UR: 6.5 [PH] (ref 5–8)
PROT UR STRIP-MCNC: NEGATIVE MG/DL
RBC # UR STRIP: NEGATIVE /UL
SP GR UR: 1.01 (ref 1–1.03)
UROBILINOGEN UR QL: NORMAL

## 2023-02-05 PROCEDURE — G0463 HOSPITAL OUTPT CLINIC VISIT: HCPCS

## 2023-02-05 PROCEDURE — 81002 URINALYSIS NONAUTO W/O SCOPE: CPT | Performed by: MIDWIFE

## 2023-02-05 PROCEDURE — 59025 FETAL NON-STRESS TEST: CPT | Performed by: MIDWIFE

## 2023-02-05 PROCEDURE — 59025 FETAL NON-STRESS TEST: CPT

## 2023-02-06 ENCOUNTER — ROUTINE PRENATAL (OUTPATIENT)
Dept: OBSTETRICS AND GYNECOLOGY | Facility: CLINIC | Age: 23
End: 2023-02-06
Payer: MEDICAID

## 2023-02-06 VITALS — WEIGHT: 180 LBS | BODY MASS INDEX: 32.92 KG/M2 | DIASTOLIC BLOOD PRESSURE: 62 MMHG | SYSTOLIC BLOOD PRESSURE: 108 MMHG

## 2023-02-06 DIAGNOSIS — O09.893 SHORT INTERVAL BETWEEN PREGNANCIES AFFECTING PREGNANCY IN THIRD TRIMESTER, ANTEPARTUM: ICD-10-CM

## 2023-02-06 DIAGNOSIS — Z34.83 ENCOUNTER FOR SUPERVISION OF OTHER NORMAL PREGNANCY IN THIRD TRIMESTER: Primary | ICD-10-CM

## 2023-02-06 DIAGNOSIS — O99.019 IRON DEFICIENCY ANEMIA DURING PREGNANCY: ICD-10-CM

## 2023-02-06 DIAGNOSIS — D50.9 IRON DEFICIENCY ANEMIA DURING PREGNANCY: ICD-10-CM

## 2023-02-06 DIAGNOSIS — O47.9 IRREGULAR UTERINE CONTRACTIONS: ICD-10-CM

## 2023-02-06 PROCEDURE — 99214 OFFICE O/P EST MOD 30 MIN: CPT | Performed by: OBSTETRICS & GYNECOLOGY

## 2023-02-06 NOTE — PROGRESS NOTES
Chief Complaint  Routine Prenatal Visit (Patient complains of contractions. )    History of Present Illness:  Meg is a  currently at 39w1d who presents today with complaints of irregular contractions.  Patient has not been able to time them.  She previously went to labor and delivery.  She reports her cervix was 1 to 2 cm.  Patient had also previously been seen for decreased fetal movement.  She denies any vaginal bleeding or spotting.  Patient did have her GBS.  She is taking her prenatal vitamins.    Exam:  Vitals:  See prenatal flowsheet as noted and reviewed  General: Alert, cooperative, and does not appear in any distress  Abdomen:   See prenatal flowsheet as noted and reviewed    Uterus gravid, non-tender; no palpable masses    No guarding or rebound tenderness  Pelvic:  See prenatal flowsheet as noted and reviewed  Ext:  See prenatal flowsheet as noted and reviewed    Moves extremities well, no cyanosis and no redness  Urine:  See prenatal flowsheet as noted and reviewed    Data Review:  The following data was reviewed by: Divya Mena MD on 2023:  Prenatal Labs:  Lab Results   Component Value Date    HGB 11.8 (L) 2023    RUBELLAABIGG <0.90 (L) 2022    HEPBSAG Negative 2022    ABO O 2022    RH Positive 2022    ABSCRN See Final Results 2022    ABSCRN Comment 2022    IIM9VNH6 Non Reactive 2022    HEPCVIRUSABY <0.1 2022    STREPGPB Negative 2023    URINECX Final report 2020       Admission on 2023, Discharged on 2023   Component Date Value   • Color 2023 Yellow    • Clarity, UA 2023 Clear    • Glucose, UA 2023 Negative    • Bilirubin 2023 Negative    • Ketones, UA 2023 Negative    • Specific Gravity  2023 1.010    • Blood, UA 2023 Negative    • pH, Urine 2023 6.5    • Protein, POC 2023 Negative    • Urobilinogen, UA 2023 Normal    • Leukocytes 2023  Negative    • Nitrite, UA 02/05/2023 Negative    Admission on 02/03/2023, Discharged on 02/03/2023   Component Date Value   • Color 02/03/2023 Yellow    • Clarity, UA 02/03/2023 Clear    • Glucose, UA 02/03/2023 Negative    • Bilirubin 02/03/2023 Negative    • Ketones, UA 02/03/2023 Negative    • Specific Gravity  02/03/2023 1.005    • Blood, UA 02/03/2023 1+ (A)    • pH, Urine 02/03/2023 6.5    • Protein, POC 02/03/2023 Negative    • Urobilinogen, UA 02/03/2023 Normal    • Leukocytes 02/03/2023 Negative    • Nitrite, UA 02/03/2023 Negative    Routine Prenatal on 01/16/2023   Component Date Value   • Strep Gp B ANDERSON 01/16/2023 Negative      Imaging:  US Ob Follow Up Transabdominal Approach  Impression:   IUP at 32+1 weeks. Limited anatomy was reviewed today and is normal in   appearance. EFW 1892g(36%) AC 35%. Cephalic presentation. Placenta is   posterior. Amniotic fluid and fetal heart rate are normal.    Nba Call MD  Obstetrics and Gynecology  [facility]     Medical Records:  Non Stress Test by Maldonado Simon RN (02/03/2023 10:13)  Non Stress Test by Zelda Fraire RN (02/05/2023 06:24)      Assessment and Plan:  Problem List Items Addressed This Visit    None  Visit Diagnoses     Encounter for supervision of other normal pregnancy in third trimester    -  Primary  Topics discussed:     induction of labor  iron supplementation  kick counts and fetal movement  labor signs and symptoms  PIH precautions  Patient declines induction of labor.  I discussed with the patient the risk versus benefits.  Patient was requesting to have her membranes stripped however fetus is too high in the pelvis.  Patient will follow-up with repeat imaging as discussed.  Labor precautions and instructions have been given.    Relevant Orders    US Ob Follow Up Transabdominal Approach    US Fetal Biophysical Profile;Without Non-Stress Testing    Irregular uterine contractions    Labor precautions and instructions have  been given.    Iron deficiency anemia during pregnancy      Patient is to continue her prenatal vitamins faithfully.    Short interval between pregnancies affecting pregnancy in third trimester, antepartum      Scan for growth next visit as noted.        Follow Up/Instructions:  Follow up as scheduled.  Patient was given instructions and counseling regarding her condition or for health maintenance advice. Please see specific information pulled into the AVS if appropriate.     Note: Speech recognition transcription software may have been used to dictate portions of this document.  An attempt at proofreading has been made though minor errors in transcription may still be present.    This note was electronically signed.  Divya Mena M.D.

## 2023-02-10 ENCOUNTER — ROUTINE PRENATAL (OUTPATIENT)
Dept: OBSTETRICS AND GYNECOLOGY | Facility: CLINIC | Age: 23
End: 2023-02-10
Payer: MEDICAID

## 2023-02-10 VITALS — WEIGHT: 181 LBS | DIASTOLIC BLOOD PRESSURE: 70 MMHG | BODY MASS INDEX: 33.11 KG/M2 | SYSTOLIC BLOOD PRESSURE: 112 MMHG

## 2023-02-10 DIAGNOSIS — Z34.93 PRENATAL CARE IN THIRD TRIMESTER: Primary | ICD-10-CM

## 2023-02-10 PROCEDURE — 99213 OFFICE O/P EST LOW 20 MIN: CPT | Performed by: OBSTETRICS & GYNECOLOGY

## 2023-02-10 NOTE — PROGRESS NOTES
Prenatal Care Visit    Subjective   Chief Complaint   Patient presents with   • Routine Prenatal Visit     Repeat growth scan, headaches, diarrhea.       History:   Meg is a  currently at 39w5d who presents for a prenatal care visit today.    No major issues today.    Social History    Tobacco Use      Smoking status: Never      Smokeless tobacco: Never       Objective   /70   Wt 82.1 kg (181 lb)   BMI 33.11 kg/m²   Physical Exam:  Normal, gestational age-appropriate exam today   SVE /-3       Plan   Medical Decision Making:    I have reviewed the prenatal labs and ultrasound(s) today. I have reviewed the most recent prenatal progress note(s).    Diagnosis: Supervision of low risk pregnancy   Latent labor   Tests/Orders/Rx today: No orders of the defined types were placed in this encounter.      Medication Management: None     Topics discussed: Prenatal care milestones  induction of labor  kick counts and fetal movement  labor signs and symptoms  PIH precautions   Tests next visit: none   Next visit: 1 week(s)     Nba Call MD  Obstetrics and Gynecology  [facility]

## 2023-02-13 ENCOUNTER — PREP FOR SURGERY (OUTPATIENT)
Dept: OTHER | Facility: HOSPITAL | Age: 23
End: 2023-02-13
Payer: MEDICAID

## 2023-02-13 ENCOUNTER — ROUTINE PRENATAL (OUTPATIENT)
Dept: OBSTETRICS AND GYNECOLOGY | Facility: CLINIC | Age: 23
End: 2023-02-13
Payer: MEDICAID

## 2023-02-13 VITALS — WEIGHT: 181 LBS | DIASTOLIC BLOOD PRESSURE: 60 MMHG | SYSTOLIC BLOOD PRESSURE: 110 MMHG | BODY MASS INDEX: 33.11 KG/M2

## 2023-02-13 DIAGNOSIS — Z34.90 ENCOUNTER FOR INDUCTION OF LABOR: Primary | ICD-10-CM

## 2023-02-13 DIAGNOSIS — Z34.93 PRENATAL CARE IN THIRD TRIMESTER: Primary | ICD-10-CM

## 2023-02-13 PROCEDURE — 99213 OFFICE O/P EST LOW 20 MIN: CPT | Performed by: OBSTETRICS & GYNECOLOGY

## 2023-02-13 RX ORDER — LIDOCAINE HYDROCHLORIDE 10 MG/ML
5 INJECTION, SOLUTION EPIDURAL; INFILTRATION; INTRACAUDAL; PERINEURAL AS NEEDED
Status: CANCELLED | OUTPATIENT
Start: 2023-02-13

## 2023-02-13 RX ORDER — ONDANSETRON 4 MG/1
4 TABLET, FILM COATED ORAL ONCE AS NEEDED
Status: CANCELLED | OUTPATIENT
Start: 2023-02-13

## 2023-02-13 RX ORDER — OXYTOCIN/0.9 % SODIUM CHLORIDE 30/500 ML
2-20 PLASTIC BAG, INJECTION (ML) INTRAVENOUS
Status: CANCELLED | OUTPATIENT
Start: 2023-02-13

## 2023-02-13 RX ORDER — PROMETHAZINE HYDROCHLORIDE 12.5 MG/1
12.5 TABLET ORAL EVERY 6 HOURS PRN
Status: CANCELLED | OUTPATIENT
Start: 2023-02-13

## 2023-02-13 RX ORDER — ONDANSETRON 4 MG/1
4 TABLET, FILM COATED ORAL EVERY 6 HOURS PRN
Status: CANCELLED | OUTPATIENT
Start: 2023-02-13

## 2023-02-13 RX ORDER — MORPHINE SULFATE 2 MG/ML
2 INJECTION, SOLUTION INTRAMUSCULAR; INTRAVENOUS ONCE AS NEEDED
Status: CANCELLED | OUTPATIENT
Start: 2023-02-13

## 2023-02-13 RX ORDER — METHYLERGONOVINE MALEATE 0.2 MG/ML
200 INJECTION INTRAVENOUS ONCE AS NEEDED
Status: CANCELLED | OUTPATIENT
Start: 2023-02-13 | End: 2023-02-14

## 2023-02-13 RX ORDER — SODIUM CHLORIDE, SODIUM LACTATE, POTASSIUM CHLORIDE, CALCIUM CHLORIDE 600; 310; 30; 20 MG/100ML; MG/100ML; MG/100ML; MG/100ML
125 INJECTION, SOLUTION INTRAVENOUS CONTINUOUS
Status: CANCELLED | OUTPATIENT
Start: 2023-02-13

## 2023-02-13 RX ORDER — PROMETHAZINE HYDROCHLORIDE 12.5 MG/1
12.5 SUPPOSITORY RECTAL EVERY 6 HOURS PRN
Status: CANCELLED | OUTPATIENT
Start: 2023-02-13

## 2023-02-13 RX ORDER — ACETAMINOPHEN 325 MG/1
650 TABLET ORAL ONCE AS NEEDED
Status: CANCELLED | OUTPATIENT
Start: 2023-02-13

## 2023-02-13 RX ORDER — MISOPROSTOL 200 UG/1
800 TABLET ORAL AS NEEDED
Status: CANCELLED | OUTPATIENT
Start: 2023-02-13

## 2023-02-13 RX ORDER — ONDANSETRON 2 MG/ML
4 INJECTION INTRAMUSCULAR; INTRAVENOUS ONCE AS NEEDED
Status: CANCELLED | OUTPATIENT
Start: 2023-02-13

## 2023-02-13 RX ORDER — ACETAMINOPHEN 325 MG/1
650 TABLET ORAL EVERY 4 HOURS PRN
Status: CANCELLED | OUTPATIENT
Start: 2023-02-13

## 2023-02-13 RX ORDER — SODIUM CHLORIDE 0.9 % (FLUSH) 0.9 %
1-10 SYRINGE (ML) INJECTION AS NEEDED
Status: CANCELLED | OUTPATIENT
Start: 2023-02-13

## 2023-02-13 RX ORDER — ONDANSETRON 2 MG/ML
4 INJECTION INTRAMUSCULAR; INTRAVENOUS EVERY 6 HOURS PRN
Status: CANCELLED | OUTPATIENT
Start: 2023-02-13

## 2023-02-13 RX ORDER — SODIUM CHLORIDE 0.9 % (FLUSH) 0.9 %
10 SYRINGE (ML) INJECTION EVERY 12 HOURS SCHEDULED
Status: CANCELLED | OUTPATIENT
Start: 2023-02-13

## 2023-02-13 RX ORDER — CARBOPROST TROMETHAMINE 250 UG/ML
250 INJECTION, SOLUTION INTRAMUSCULAR ONCE AS NEEDED
Status: CANCELLED | OUTPATIENT
Start: 2023-02-13 | End: 2023-02-14

## 2023-02-13 NOTE — PROGRESS NOTES
Prenatal Care Visit    Subjective   Chief Complaint   Patient presents with   • Routine Prenatal Visit     No complaints       History:   Meg is a  currently at 40w1d who presents for a prenatal care visit today.    No major issues today.    Social History    Tobacco Use      Smoking status: Never      Smokeless tobacco: Never       Objective   /60   Wt 82.1 kg (181 lb)   BMI 33.11 kg/m²   Physical Exam:  Normal, gestational age-appropriate exam today   SVE /-3       Plan   Medical Decision Making:    I have reviewed the prenatal labs and ultrasound(s) today. I have reviewed the most recent prenatal progress note(s).    Diagnosis: Supervision of low risk pregnancy   Latent labor   Tests/Orders/Rx today: No orders of the defined types were placed in this encounter.      Medication Management: None     Topics discussed: Prenatal care milestones  induction of labor  kick counts and fetal movement  labor signs and symptoms  PIH precautions   Tests next visit: none   Next visit: none     Nba Call MD  Obstetrics and Gynecology  [facility]

## 2023-02-14 ENCOUNTER — HOSPITAL ENCOUNTER (OUTPATIENT)
Dept: LABOR AND DELIVERY | Facility: HOSPITAL | Age: 23
Discharge: HOME | End: 2023-02-14
Payer: MEDICAID

## 2023-02-14 ENCOUNTER — ANESTHESIA (OUTPATIENT)
Dept: LABOR AND DELIVERY | Facility: HOSPITAL | Age: 23
End: 2023-02-14
Payer: MEDICAID

## 2023-02-14 ENCOUNTER — HOSPITAL ENCOUNTER (INPATIENT)
Facility: HOSPITAL | Age: 23
LOS: 2 days | Discharge: HOME | End: 2023-02-16
Attending: MIDWIFE | Admitting: OBSTETRICS & GYNECOLOGY
Payer: MEDICAID

## 2023-02-14 ENCOUNTER — ANESTHESIA EVENT (OUTPATIENT)
Dept: LABOR AND DELIVERY | Facility: HOSPITAL | Age: 23
End: 2023-02-14
Payer: MEDICAID

## 2023-02-14 DIAGNOSIS — Z34.90 ENCOUNTER FOR INDUCTION OF LABOR: ICD-10-CM

## 2023-02-14 LAB
ABO GROUP BLD: NORMAL
BASOPHILS # BLD AUTO: 0.04 10*3/MM3 (ref 0–0.2)
BASOPHILS NFR BLD AUTO: 0.6 % (ref 0–1.5)
BILIRUB BLD-MCNC: NEGATIVE MG/DL
BLD GP AB SCN SERPL QL: NEGATIVE
CLARITY, POC: CLEAR
COLOR UR: YELLOW
DEPRECATED RDW RBC AUTO: 44.5 FL (ref 37–54)
EOSINOPHIL # BLD AUTO: 0.06 10*3/MM3 (ref 0–0.4)
EOSINOPHIL NFR BLD AUTO: 1 % (ref 0.3–6.2)
ERYTHROCYTE [DISTWIDTH] IN BLOOD BY AUTOMATED COUNT: 14.7 % (ref 12.3–15.4)
GLUCOSE UR STRIP-MCNC: NEGATIVE MG/DL
HCT VFR BLD AUTO: 36.1 % (ref 34–46.6)
HGB BLD-MCNC: 11.9 G/DL (ref 12–15.9)
IMM GRANULOCYTES # BLD AUTO: 0.26 10*3/MM3 (ref 0–0.05)
IMM GRANULOCYTES NFR BLD AUTO: 4.1 % (ref 0–0.5)
KETONES UR QL: NEGATIVE
LEUKOCYTE EST, POC: NEGATIVE
LYMPHOCYTES # BLD AUTO: 2.06 10*3/MM3 (ref 0.7–3.1)
LYMPHOCYTES NFR BLD AUTO: 32.9 % (ref 19.6–45.3)
MCH RBC QN AUTO: 27.5 PG (ref 26.6–33)
MCHC RBC AUTO-ENTMCNC: 33 G/DL (ref 31.5–35.7)
MCV RBC AUTO: 83.4 FL (ref 79–97)
MONOCYTES # BLD AUTO: 0.47 10*3/MM3 (ref 0.1–0.9)
MONOCYTES NFR BLD AUTO: 7.5 % (ref 5–12)
NEUTROPHILS NFR BLD AUTO: 3.38 10*3/MM3 (ref 1.7–7)
NEUTROPHILS NFR BLD AUTO: 53.9 % (ref 42.7–76)
NITRITE UR-MCNC: NEGATIVE MG/ML
NRBC BLD AUTO-RTO: 0 /100 WBC (ref 0–0.2)
PH UR: 6.5 [PH] (ref 5–8)
PLATELET # BLD AUTO: 228 10*3/MM3 (ref 140–450)
PMV BLD AUTO: 10.5 FL (ref 6–12)
PROT UR STRIP-MCNC: NEGATIVE MG/DL
RBC # BLD AUTO: 4.33 10*6/MM3 (ref 3.77–5.28)
RBC # UR STRIP: NEGATIVE /UL
RH BLD: POSITIVE
SARS-COV-2 RNA RESP QL NAA+PROBE: NOT DETECTED
SP GR UR: 1 (ref 1–1.03)
T&S EXPIRATION DATE: NORMAL
UROBILINOGEN UR QL: NORMAL
WBC NRBC COR # BLD: 6.27 10*3/MM3 (ref 3.4–10.8)

## 2023-02-14 PROCEDURE — 25010000002 MEASLES, MUMPS & RUBELLA VAC RECONSTITUTED SOLUTION: Performed by: MIDWIFE

## 2023-02-14 PROCEDURE — 86900 BLOOD TYPING SEROLOGIC ABO: CPT | Performed by: OBSTETRICS & GYNECOLOGY

## 2023-02-14 PROCEDURE — 3E033VJ INTRODUCTION OF OTHER HORMONE INTO PERIPHERAL VEIN, PERCUTANEOUS APPROACH: ICD-10-PCS | Performed by: MIDWIFE

## 2023-02-14 PROCEDURE — 90471 IMMUNIZATION ADMIN: CPT | Performed by: MIDWIFE

## 2023-02-14 PROCEDURE — 87635 SARS-COV-2 COVID-19 AMP PRB: CPT | Performed by: MIDWIFE

## 2023-02-14 PROCEDURE — 25010000002 MORPHINE PER 10 MG: Performed by: MIDWIFE

## 2023-02-14 PROCEDURE — 85025 COMPLETE CBC W/AUTO DIFF WBC: CPT | Performed by: OBSTETRICS & GYNECOLOGY

## 2023-02-14 PROCEDURE — 90707 MMR VACCINE SC: CPT | Performed by: MIDWIFE

## 2023-02-14 PROCEDURE — 59410 OBSTETRICAL CARE: CPT | Performed by: MIDWIFE

## 2023-02-14 PROCEDURE — 81002 URINALYSIS NONAUTO W/O SCOPE: CPT | Performed by: OBSTETRICS & GYNECOLOGY

## 2023-02-14 PROCEDURE — 59025 FETAL NON-STRESS TEST: CPT

## 2023-02-14 PROCEDURE — 86901 BLOOD TYPING SEROLOGIC RH(D): CPT | Performed by: OBSTETRICS & GYNECOLOGY

## 2023-02-14 PROCEDURE — 4A1HX4Z MONITORING OF PRODUCTS OF CONCEPTION, CARDIAC ELECTRICAL ACTIVITY, EXTERNAL APPROACH: ICD-10-PCS | Performed by: MIDWIFE

## 2023-02-14 PROCEDURE — G0463 HOSPITAL OUTPT CLINIC VISIT: HCPCS

## 2023-02-14 PROCEDURE — 86850 RBC ANTIBODY SCREEN: CPT | Performed by: OBSTETRICS & GYNECOLOGY

## 2023-02-14 RX ORDER — IBUPROFEN 600 MG/1
600 TABLET ORAL EVERY 6 HOURS PRN
Status: DISCONTINUED | OUTPATIENT
Start: 2023-02-14 | End: 2023-02-16 | Stop reason: HOSPADM

## 2023-02-14 RX ORDER — METHYLERGONOVINE MALEATE 0.2 MG/ML
200 INJECTION INTRAVENOUS ONCE AS NEEDED
Status: DISCONTINUED | OUTPATIENT
Start: 2023-02-14 | End: 2023-02-14 | Stop reason: HOSPADM

## 2023-02-14 RX ORDER — LIDOCAINE HYDROCHLORIDE 10 MG/ML
5 INJECTION, SOLUTION EPIDURAL; INFILTRATION; INTRACAUDAL; PERINEURAL AS NEEDED
Status: DISCONTINUED | OUTPATIENT
Start: 2023-02-14 | End: 2023-02-14

## 2023-02-14 RX ORDER — ACETAMINOPHEN 325 MG/1
650 TABLET ORAL EVERY 6 HOURS PRN
Status: DISCONTINUED | OUTPATIENT
Start: 2023-02-14 | End: 2023-02-16 | Stop reason: HOSPADM

## 2023-02-14 RX ORDER — MORPHINE SULFATE 4 MG/ML
4 INJECTION, SOLUTION INTRAMUSCULAR; INTRAVENOUS ONCE AS NEEDED
Status: DISCONTINUED | OUTPATIENT
Start: 2023-02-14 | End: 2023-02-14 | Stop reason: HOSPADM

## 2023-02-14 RX ORDER — MORPHINE SULFATE 2 MG/ML
2 INJECTION, SOLUTION INTRAMUSCULAR; INTRAVENOUS ONCE AS NEEDED
Status: DISCONTINUED | OUTPATIENT
Start: 2023-02-14 | End: 2023-02-14 | Stop reason: HOSPADM

## 2023-02-14 RX ORDER — DOCUSATE SODIUM 100 MG/1
100 CAPSULE, LIQUID FILLED ORAL 2 TIMES DAILY PRN
Status: DISCONTINUED | OUTPATIENT
Start: 2023-02-14 | End: 2023-02-16 | Stop reason: HOSPADM

## 2023-02-14 RX ORDER — HYDROCORTISONE 25 MG/G
1 CREAM TOPICAL AS NEEDED
Status: DISCONTINUED | OUTPATIENT
Start: 2023-02-14 | End: 2023-02-16 | Stop reason: HOSPADM

## 2023-02-14 RX ORDER — MISOPROSTOL 200 UG/1
800 TABLET ORAL AS NEEDED
Status: DISCONTINUED | OUTPATIENT
Start: 2023-02-14 | End: 2023-02-14 | Stop reason: HOSPADM

## 2023-02-14 RX ORDER — PROMETHAZINE HYDROCHLORIDE 12.5 MG/1
12.5 TABLET ORAL EVERY 6 HOURS PRN
Status: DISCONTINUED | OUTPATIENT
Start: 2023-02-14 | End: 2023-02-14

## 2023-02-14 RX ORDER — ONDANSETRON 4 MG/1
4 TABLET, FILM COATED ORAL ONCE AS NEEDED
Status: DISCONTINUED | OUTPATIENT
Start: 2023-02-14 | End: 2023-02-14 | Stop reason: HOSPADM

## 2023-02-14 RX ORDER — PROMETHAZINE HYDROCHLORIDE 12.5 MG/1
12.5 SUPPOSITORY RECTAL EVERY 6 HOURS PRN
Status: DISCONTINUED | OUTPATIENT
Start: 2023-02-14 | End: 2023-02-14

## 2023-02-14 RX ORDER — ACETAMINOPHEN 325 MG/1
650 TABLET ORAL ONCE AS NEEDED
Status: DISCONTINUED | OUTPATIENT
Start: 2023-02-14 | End: 2023-02-14 | Stop reason: HOSPADM

## 2023-02-14 RX ORDER — SODIUM CHLORIDE 0.9 % (FLUSH) 0.9 %
1-10 SYRINGE (ML) INJECTION AS NEEDED
Status: DISCONTINUED | OUTPATIENT
Start: 2023-02-14 | End: 2023-02-14

## 2023-02-14 RX ORDER — MAGNESIUM CARB/ALUMINUM HYDROX 105-160MG
30 TABLET,CHEWABLE ORAL ONCE
Status: COMPLETED | OUTPATIENT
Start: 2023-02-14 | End: 2023-02-14

## 2023-02-14 RX ORDER — HYDROCODONE BITARTRATE AND ACETAMINOPHEN 10; 325 MG/1; MG/1
1 TABLET ORAL EVERY 4 HOURS PRN
Status: DISCONTINUED | OUTPATIENT
Start: 2023-02-14 | End: 2023-02-16 | Stop reason: HOSPADM

## 2023-02-14 RX ORDER — ONDANSETRON 2 MG/ML
4 INJECTION INTRAMUSCULAR; INTRAVENOUS EVERY 6 HOURS PRN
Status: DISCONTINUED | OUTPATIENT
Start: 2023-02-14 | End: 2023-02-16 | Stop reason: HOSPADM

## 2023-02-14 RX ORDER — PROMETHAZINE HYDROCHLORIDE 25 MG/1
25 TABLET ORAL EVERY 6 HOURS PRN
Status: DISCONTINUED | OUTPATIENT
Start: 2023-02-14 | End: 2023-02-16 | Stop reason: HOSPADM

## 2023-02-14 RX ORDER — MORPHINE SULFATE 4 MG/ML
4 INJECTION, SOLUTION INTRAMUSCULAR; INTRAVENOUS EVERY 4 HOURS PRN
Status: DISCONTINUED | OUTPATIENT
Start: 2023-02-14 | End: 2023-02-14

## 2023-02-14 RX ORDER — PROMETHAZINE HYDROCHLORIDE 12.5 MG/1
12.5 SUPPOSITORY RECTAL EVERY 6 HOURS PRN
Status: DISCONTINUED | OUTPATIENT
Start: 2023-02-14 | End: 2023-02-16 | Stop reason: HOSPADM

## 2023-02-14 RX ORDER — ONDANSETRON 4 MG/1
4 TABLET, FILM COATED ORAL EVERY 8 HOURS PRN
Status: DISCONTINUED | OUTPATIENT
Start: 2023-02-14 | End: 2023-02-16 | Stop reason: HOSPADM

## 2023-02-14 RX ORDER — ONDANSETRON 2 MG/ML
4 INJECTION INTRAMUSCULAR; INTRAVENOUS EVERY 6 HOURS PRN
Status: DISCONTINUED | OUTPATIENT
Start: 2023-02-14 | End: 2023-02-14

## 2023-02-14 RX ORDER — CARBOPROST TROMETHAMINE 250 UG/ML
250 INJECTION, SOLUTION INTRAMUSCULAR ONCE AS NEEDED
Status: DISCONTINUED | OUTPATIENT
Start: 2023-02-14 | End: 2023-02-14 | Stop reason: HOSPADM

## 2023-02-14 RX ORDER — ONDANSETRON 4 MG/1
4 TABLET, FILM COATED ORAL EVERY 6 HOURS PRN
Status: DISCONTINUED | OUTPATIENT
Start: 2023-02-14 | End: 2023-02-14

## 2023-02-14 RX ORDER — SODIUM CHLORIDE 0.9 % (FLUSH) 0.9 %
1-10 SYRINGE (ML) INJECTION AS NEEDED
Status: DISCONTINUED | OUTPATIENT
Start: 2023-02-14 | End: 2023-02-16 | Stop reason: HOSPADM

## 2023-02-14 RX ORDER — SODIUM CHLORIDE 0.9 % (FLUSH) 0.9 %
10 SYRINGE (ML) INJECTION EVERY 12 HOURS SCHEDULED
Status: DISCONTINUED | OUTPATIENT
Start: 2023-02-14 | End: 2023-02-14

## 2023-02-14 RX ORDER — ONDANSETRON 2 MG/ML
4 INJECTION INTRAMUSCULAR; INTRAVENOUS ONCE AS NEEDED
Status: DISCONTINUED | OUTPATIENT
Start: 2023-02-14 | End: 2023-02-14 | Stop reason: HOSPADM

## 2023-02-14 RX ORDER — MORPHINE SULFATE 2 MG/ML
6 INJECTION, SOLUTION INTRAMUSCULAR; INTRAVENOUS
Status: DISCONTINUED | OUTPATIENT
Start: 2023-02-14 | End: 2023-02-14

## 2023-02-14 RX ORDER — DIPHENHYDRAMINE HCL 25 MG
25 CAPSULE ORAL NIGHTLY PRN
Status: DISCONTINUED | OUTPATIENT
Start: 2023-02-14 | End: 2023-02-16 | Stop reason: HOSPADM

## 2023-02-14 RX ORDER — HYDROCODONE BITARTRATE AND ACETAMINOPHEN 5; 325 MG/1; MG/1
1 TABLET ORAL EVERY 4 HOURS PRN
Status: DISCONTINUED | OUTPATIENT
Start: 2023-02-14 | End: 2023-02-16 | Stop reason: HOSPADM

## 2023-02-14 RX ORDER — BISACODYL 10 MG
10 SUPPOSITORY, RECTAL RECTAL DAILY PRN
Status: DISCONTINUED | OUTPATIENT
Start: 2023-02-15 | End: 2023-02-16 | Stop reason: HOSPADM

## 2023-02-14 RX ORDER — OXYTOCIN/0.9 % SODIUM CHLORIDE 30/500 ML
2-20 PLASTIC BAG, INJECTION (ML) INTRAVENOUS
Status: DISCONTINUED | OUTPATIENT
Start: 2023-02-14 | End: 2023-02-14

## 2023-02-14 RX ORDER — PRENATAL VIT/IRON FUM/FOLIC AC 27MG-0.8MG
1 TABLET ORAL DAILY
Status: DISCONTINUED | OUTPATIENT
Start: 2023-02-15 | End: 2023-02-16 | Stop reason: HOSPADM

## 2023-02-14 RX ORDER — SODIUM CHLORIDE, SODIUM LACTATE, POTASSIUM CHLORIDE, CALCIUM CHLORIDE 600; 310; 30; 20 MG/100ML; MG/100ML; MG/100ML; MG/100ML
125 INJECTION, SOLUTION INTRAVENOUS CONTINUOUS
Status: DISCONTINUED | OUTPATIENT
Start: 2023-02-14 | End: 2023-02-14

## 2023-02-14 RX ORDER — ACETAMINOPHEN 325 MG/1
650 TABLET ORAL EVERY 4 HOURS PRN
Status: DISCONTINUED | OUTPATIENT
Start: 2023-02-14 | End: 2023-02-14

## 2023-02-14 RX ORDER — PROMETHAZINE HYDROCHLORIDE 12.5 MG/1
12.5 TABLET ORAL EVERY 6 HOURS PRN
Status: DISCONTINUED | OUTPATIENT
Start: 2023-02-14 | End: 2023-02-14 | Stop reason: HOSPADM

## 2023-02-14 RX ORDER — EPHEDRINE SULFATE 5 MG/ML
10 INJECTION INTRAVENOUS
Status: DISCONTINUED | OUTPATIENT
Start: 2023-02-14 | End: 2023-02-14

## 2023-02-14 RX ADMIN — MINERAL OIL 30 ML: 1000 SOLUTION ORAL at 13:34

## 2023-02-14 RX ADMIN — MORPHINE SULFATE 4 MG: 4 INJECTION, SOLUTION INTRAMUSCULAR; INTRAVENOUS at 11:06

## 2023-02-14 RX ADMIN — Medication 2 MILLI-UNITS/MIN: at 07:35

## 2023-02-14 RX ADMIN — MEASLES, MUMPS, AND RUBELLA VIRUS VACCINE LIVE 0.5 ML: 1000; 12500; 1000 INJECTION, POWDER, LYOPHILIZED, FOR SUSPENSION SUBCUTANEOUS at 21:34

## 2023-02-14 RX ADMIN — SODIUM CHLORIDE, POTASSIUM CHLORIDE, SODIUM LACTATE AND CALCIUM CHLORIDE 125 ML/HR: 600; 310; 30; 20 INJECTION, SOLUTION INTRAVENOUS at 06:50

## 2023-02-14 RX ADMIN — SODIUM CHLORIDE, POTASSIUM CHLORIDE, SODIUM LACTATE AND CALCIUM CHLORIDE 125 ML/HR: 600; 310; 30; 20 INJECTION, SOLUTION INTRAVENOUS at 13:32

## 2023-02-14 RX ADMIN — HYDROCODONE BITARTRATE AND ACETAMINOPHEN 1 TABLET: 5; 325 TABLET ORAL at 16:00

## 2023-02-14 RX ADMIN — MORPHINE SULFATE 4 MG: 4 INJECTION, SOLUTION INTRAMUSCULAR; INTRAVENOUS at 13:01

## 2023-02-14 NOTE — ANESTHESIA PREPROCEDURE EVALUATION
Anesthesia Evaluation     Patient summary reviewed and Nursing notes reviewed   no history of anesthetic complications:  NPO Solid Status: > 8 hours  NPO Liquid Status: > 8 hours           Airway   Mallampati: II  TM distance: >3 FB  Neck ROM: full  no difficulty expected and Possible difficult intubation  Dental - normal exam     Pulmonary - negative pulmonary ROS and normal exam   Cardiovascular - negative cardio ROS and normal exam    Rhythm: regular  Rate: normal        Neuro/Psych- negative ROS  GI/Hepatic/Renal/Endo - negative ROS     Musculoskeletal (-) negative ROS    Abdominal    Substance History - negative use     OB/GYN    (+) Pregnant,         Other - negative ROS                       Anesthesia Plan    ASA 2 - emergent     epidural     (Risks of epidural analgesia discussed, including but not limited to:  Headache, itching, n&v, infection, failure, decreased bp, decreased fetal hr, possible c/s. Permanent chronic back pain, nerve damage, paralysis, etc.    Patient told that epidural analgesia may not relieve all the labor pain, that she may still feel pressure and that she may still feel pain as the baby is close to delivery.     All questions answered and informed consent obtained.    )    Anesthetic plan, risks, benefits, and alternatives have been provided, discussed and informed consent has been obtained with: patient.        CODE STATUS:    Level Of Support Discussed With: Patient  Code Status (Patient has no pulse and is not breathing): CPR (Attempt to Resuscitate)  Medical Interventions (Patient has pulse or is breathing): Full

## 2023-02-14 NOTE — NON STRESS TEST
Meg Mccrary, a  at 40w2d with an LEIDY of 2023, by Ultrasound, was seen at [facility] for a nonstress test.    Chief Complaint   Patient presents with   • Scheduled Induction     Post dates         Patient Active Problem List   Diagnosis   • Encounter for induction of labor       Start Time: 620  Stop Time: 644    Interpretation A  Nonstress Test Interpretation A: Reactive

## 2023-02-14 NOTE — H&P
LAUREL Young  Meg Mccrary  : 2000  MRN: [id number]  CSN: [id number]    History and Physical    Chief Complaint   Patient presents with   • Scheduled Induction     Post dates        Subjective   Meg Mccrary is a 22 y.o. year old  with an Estimated Date of Delivery: 23 currently 40w2d presenting for induction of labor for favorable cervix at term and elective at term per patient request.     Risks of labor induction including prolongation of labor, increased risks for both  section and operative vaginal birth have been discussed at length.     Prenatal care has been with MARYLU Young.  It has been benign. First PNV on 22 @ 15 weeks with 14 visits. Weight gain = 41 lbs.     OB History    Para Term  AB Living   4 2 2 0 1 2   SAB IAB Ectopic Molar Multiple Live Births   1 0 0 0 0 2      # Outcome Date GA Lbr Chalo/2nd Weight Sex Delivery Anes PTL Lv   4 Current            3 SAB 22           2 Term 22 38w4d / 00:01 3147 g (6 lb 15 oz) M Vag-Spont None N ROSA      Name: LISA BAIN      Apgar1: 8  Apgar5: 9   1 Term 18 39w2d 04:58 / 00:38 3147 g (6 lb 15 oz) F Vag-Spont None N ROSA      Name: MARTHA BAIN      Apgar1: 8  Apgar5: 9     Past Medical History:   Diagnosis Date   • Rubella non-immune status, antepartum 2020   • Urinary tract infection      History reviewed. No pertinent surgical history.    Current Facility-Administered Medications:   •  acetaminophen (TYLENOL) tablet 650 mg, 650 mg, Oral, Q4H PRN, America Michelle CNM  •  ePHEDrine Sulfate (Pressors) 5 MG/ML injection 10 mg, 10 mg, Intravenous, Q10 Min PRN, America Michelle CNM  •  fentaNYL 2mcg/mL and ropivacaine 0.2% in NS epidural 100 mL, 12 mL/hr, Epidural, Continuous, America Michelle CNM  •  influenza vac split quad (FLUZONE,FLUARIX,AFLURIA,FLULAVAL) injection 0.5 mL, 0.5 mL, Intramuscular, During Hospitalization, America Michelle,  CNM  •  lactated ringers bolus 1,000 mL, 1,000 mL, Intravenous, Once, America Michelle, CNM  •  lactated ringers bolus 1,000 mL, 1,000 mL, Intravenous, Once, America Michelle, MAYRAM  •  lactated ringers infusion, 125 mL/hr, Intravenous, Continuous, America Michelle, CNM, Last Rate: 125 mL/hr at 02/14/23 0650, 125 mL/hr at 02/14/23 0650  •  lidocaine PF 1% (XYLOCAINE) injection 5 mL, 5 mL, Intradermal, PRN, America Michelle, CNM  •  mineral oil liquid 30 mL, 30 mL, Topical, Once, America Michelle CNM  •  Morphine sulfate (PF) injection 4 mg, 4 mg, Intravenous, Q4H PRN, America Michelle, MAYRAM  •  Morphine sulfate (PF) injection 6 mg, 6 mg, Intravenous, Q2H PRN, America Michelle CNM  •  ondansetron (ZOFRAN) tablet 4 mg, 4 mg, Oral, Q6H PRN **OR** ondansetron (ZOFRAN) injection 4 mg, 4 mg, Intravenous, Q6H PRN, America Michelle CNM  •  oxytocin (PITOCIN) 30 units in 0.9% sodium chloride 500 mL (premix), 2-20 suzy-units/min, Intravenous, Titrated, America Michelle, CNM, Last Rate: 4 mL/hr at 02/14/23 0805, 4 suzy-units/min at 02/14/23 0805  •  promethazine (PHENERGAN) suppository 12.5 mg, 12.5 mg, Rectal, Q6H PRN **OR** promethazine (PHENERGAN) tablet 12.5 mg, 12.5 mg, Oral, Q6H PRN, America Michelle, MAYRAM  •  sodium chloride 0.9 % flush 1-10 mL, 1-10 mL, Intravenous, PRN, America Michelle A, CNM  •  sodium chloride 0.9 % flush 10 mL, 10 mL, Intravenous, Q12H, America Michelle, MAYRAM    Allergies   Allergen Reactions   • Fish-Derived Products Rash     Social History    Tobacco Use      Smoking status: Never      Smokeless tobacco: Never    Review of Systems   Constitutional: Negative.    Respiratory: Negative.    Cardiovascular: Negative.    Gastrointestinal: Negative.    Genitourinary: Negative.    Musculoskeletal: Negative.    Psychiatric/Behavioral: Negative.    All other systems reviewed and are negative.        Objective   /67   Pulse 90   Temp 98.3 °F (36.8 °C) (Oral)   Resp 18   Wt 81.8 kg (180  lb 4.8 oz)   Breastfeeding Yes   BMI 32.98 kg/m²                                           General:  well developed; well nourished  no acute distress   Neck:    Heart:   supple and no masses  normal apical impulse  regular rate and rhythm   Lungs: breathing is unlabored  clear to auscultation bilaterally   Abdomen: gravid  EFW: 8#, growth scan last week 69%, AC 78%, posterior placenta   FHT's: reassuring, reactive and category 1   Cervix: was checked (by me): 4 cm / 50 % / -2   Presentation: cephalic   Contractions: irregular - external monitors used Pitocin @ 2 mu   Extremities:   normal appearance with no cyanosis or edema and no calf tenderness   Skin:  Skin color, texture, turgor normal. No rashes or lesions or Skin warm and dry   Psych:  Normal. and Alert and oriented, appropriate affect.       Prenatal Labs  Lab Results   Component Value Date    HGB 11.9 (L) 02/14/2023    HEPBSAG Negative 08/23/2022    ABSCRN Negative 02/14/2023    LZJ2TIL6 Non Reactive 08/23/2022    HEPCVIRUSABY <0.1 08/23/2022    STREPGPB Negative 01/16/2023    URINECX Final report 07/27/2020       Current Labs Reviewed   Lab Results (last 24 hours)     Procedure Component Value Units Date/Time    COVID PRE-OP / PRE-PROCEDURE SCREENING ORDER (NO ISOLATION) - Swab, Nasal Cavity [[id number]]  (Normal) Collected: 02/14/23 0621    Specimen: Swab from Nasal Cavity Updated: 02/14/23 0720    Narrative:      The following orders were created for panel order COVID PRE-OP / PRE-PROCEDURE SCREENING ORDER (NO ISOLATION) - Swab, Nasal Cavity.  Procedure                               Abnormality         Status                     ---------                               -----------         ------                     COVID-19,[facility] IN-CAITLIN...[[id number]]  Normal              Final result                 Please view results for these tests on the individual orders.    COVID-19,[facility] [employer] IN-HOUSE,Nasal Swab No Transport Media 3-4 HR TAT - Swab, Nasal Cavity  [[id number]]  (Normal) Collected: 02/14/23 0621    Specimen: Swab from Nasal Cavity Updated: 02/14/23 0720     COVID19 Not Detected    Narrative:      Fact sheet for providers: https://www.fda.gov/media/168498/download     Fact sheet for patients: https://www.fda.gov/media/523749/download    Test performed by PCR.    Consider negative results in combination with clinical observations, patient history, and epidemiological information.    CBC & Differential [[id number]]  (Abnormal) Collected: 02/14/23 0646    Specimen: Blood Updated: 02/14/23 0659    Narrative:      The following orders were created for panel order CBC & Differential.  Procedure                               Abnormality         Status                     ---------                               -----------         ------                     CBC Auto Differential[[id number]]        Abnormal            Final result                 Please view results for these tests on the individual orders.    CBC Auto Differential [[id number]]  (Abnormal) Collected: 02/14/23 0646    Specimen: Blood Updated: 02/14/23 0659     WBC 6.27 10*3/mm3      RBC 4.33 10*6/mm3      Hemoglobin 11.9 g/dL      Hematocrit 36.1 %      MCV 83.4 fL      MCH 27.5 pg      MCHC 33.0 g/dL      RDW 14.7 %      RDW-SD 44.5 fl      MPV 10.5 fL      Platelets 228 10*3/mm3      Neutrophil % 53.9 %      Lymphocyte % 32.9 %      Monocyte % 7.5 %      Eosinophil % 1.0 %      Basophil % 0.6 %      Immature Grans % 4.1 %      Neutrophils, Absolute 3.38 10*3/mm3      Lymphocytes, Absolute 2.06 10*3/mm3      Monocytes, Absolute 0.47 10*3/mm3      Eosinophils, Absolute 0.06 10*3/mm3      Basophils, Absolute 0.04 10*3/mm3      Immature Grans, Absolute 0.26 10*3/mm3      nRBC 0.0 /100 WBC     POC Urinalysis Dipstick [[id number]]  (Normal) Collected: 02/14/23 0633    Specimen: Urine Updated: 02/14/23 0634     Color Yellow     Clarity, UA Clear     Glucose, UA Negative mg/dL      Bilirubin Negative     Ketones,  UA Negative     Specific Gravity  1.005     Blood, UA Negative     pH, Urine 6.5     Protein, POC Negative mg/dL      Urobilinogen, UA Normal     Leukocytes Negative     Nitrite, UA Negative             ASSESSMENT  1. IUP at 40w2d  2. Induction of labor because of favorable cervix at term and elective at term per patient request  3. Group B strep status: negative  4. Reactive NST    PLAN  1. Admit to   2. Pitocin induction and increase per protocol  3. All procedures explained to patient and spouse. Questions answered and verbalized understanding.  4. Anticipate     America Michelle, APRN  2023  08:12 EST

## 2023-02-14 NOTE — L&D DELIVERY NOTE
[facility]   Vaginal Delivery Note    Patient Name: Meg Mccrary  : 2000  MRN: [id number]    Date of Delivery: 2023     Diagnosis     Pre & Post-Delivery:  Intrauterine pregnancy at 40w2d  Labor status: Induced Onset of Labor     Encounter for induction of labor     (spontaneous vaginal delivery)             Problem List    Transfer to Postpartum     Review the Delivery Report for details.     Delivery     Delivery: Vaginal, Spontaneous     YOB: 2022    Time of Birth:  Gestational Age 1:35 PM   40w2d     Anesthesia: None     Delivering clinician: America PATEL Foster    Forceps?   No   Vacuum? No    Shoulder dystocia present: No        Delivery narrative:  Meg progressed to C+P and pushed effectively. Mouth and nose bulb suctioned on perineum. Anterior shoulder was delivered easily with gentle traction and maternal effort followed by posterior shoulder.  viable male. Infant stimulated, dried, and placed on mom's abdomen. Infant with lusty cry and spontaneous respirations. Delayed cord double clamped; cut by dad. Cord blood obtained. Placenta delivered Schultze intact with 3V cord. Pitocin 20 units IV given.  Perineum inspected and no lacerations noted. FF @ U, light rubra lochia.       Infant     Findings: male  infant     Infant observations: Weight: 3657 g (8 lb 1 oz)   Length: 20.5  in  Observations/Comments:        Apgars: 9  @ 1 minute /    9  @ 5 minutes   Infant Name: Vikas     Placenta & Cord         Placenta delivered  Spontaneous  at   2023  1:39 PM     Cord: 3 vessels  present.   Nuchal Cord?  no   Cord blood obtained: Yes    Cord gases obtained:  No    Cord gas results: Venous:  No results found for: PHCVEN    Arterial:  No results found for: PHCART     Repair      Episiotomy: None     No    Lacerations: No   Estimated Blood Loss: 250 ml     Quantitative Blood Loss:    QBL from VAG DEL: 50 (23 1339)     Complications     none    Disposition      Mother to Mother Baby/Postpartum  in stable condition currently.  Baby remains with mom  in stable condition currently.    America Michelle CNM  02/14/23  15:34 EST

## 2023-02-14 NOTE — PROGRESS NOTES
Hector Mccrary  : 2000  MRN: [id number]  CSN: [id number]    Labor progress note    Subjective   She reports pelvic pressure with some ctxs.     Objective   Min/max vitals past 24 hours:  Temp  Min: 98.3 °F (36.8 °C)  Max: 98.8 °F (37.1 °C)   BP  Min: 94/69  Max: 116/67   Pulse  Min: 82  Max: 115   Resp  Min: 18  Max: 18        FHT's: reassuring, reactive, early decelerations are present and category 1   Cervix: was checked (by me): 7 cm / 80 % / -2   Contractions: every 2-3 minutes - external monitors used Pitocin @ 10 mu      Assessment   1. IUP at 40w2d  2. Fetal status reassuring     Plan   1. Continue with induction  2. Position changes/peanut ball    America Michelle, APRN  2023  12:35 EST

## 2023-02-15 LAB
BASOPHILS # BLD AUTO: 0.04 10*3/MM3 (ref 0–0.2)
BASOPHILS NFR BLD AUTO: 0.4 % (ref 0–1.5)
DEPRECATED RDW RBC AUTO: 45.4 FL (ref 37–54)
EOSINOPHIL # BLD AUTO: 0.07 10*3/MM3 (ref 0–0.4)
EOSINOPHIL NFR BLD AUTO: 0.8 % (ref 0.3–6.2)
ERYTHROCYTE [DISTWIDTH] IN BLOOD BY AUTOMATED COUNT: 14.7 % (ref 12.3–15.4)
HCT VFR BLD AUTO: 33.1 % (ref 34–46.6)
HGB BLD-MCNC: 10.6 G/DL (ref 12–15.9)
IMM GRANULOCYTES # BLD AUTO: 0.2 10*3/MM3 (ref 0–0.05)
IMM GRANULOCYTES NFR BLD AUTO: 2.2 % (ref 0–0.5)
LYMPHOCYTES # BLD AUTO: 2.58 10*3/MM3 (ref 0.7–3.1)
LYMPHOCYTES NFR BLD AUTO: 28.1 % (ref 19.6–45.3)
MCH RBC QN AUTO: 27.2 PG (ref 26.6–33)
MCHC RBC AUTO-ENTMCNC: 32 G/DL (ref 31.5–35.7)
MCV RBC AUTO: 85.1 FL (ref 79–97)
MONOCYTES # BLD AUTO: 0.8 10*3/MM3 (ref 0.1–0.9)
MONOCYTES NFR BLD AUTO: 8.7 % (ref 5–12)
NEUTROPHILS NFR BLD AUTO: 5.5 10*3/MM3 (ref 1.7–7)
NEUTROPHILS NFR BLD AUTO: 59.8 % (ref 42.7–76)
NRBC BLD AUTO-RTO: 0 /100 WBC (ref 0–0.2)
PLATELET # BLD AUTO: 221 10*3/MM3 (ref 140–450)
PMV BLD AUTO: 10.7 FL (ref 6–12)
RBC # BLD AUTO: 3.89 10*6/MM3 (ref 3.77–5.28)
WBC NRBC COR # BLD: 9.19 10*3/MM3 (ref 3.4–10.8)

## 2023-02-15 PROCEDURE — 85025 COMPLETE CBC W/AUTO DIFF WBC: CPT | Performed by: MIDWIFE

## 2023-02-15 RX ORDER — FERROUS SULFATE TAB EC 324 MG (65 MG FE EQUIVALENT) 324 (65 FE) MG
324 TABLET DELAYED RESPONSE ORAL 2 TIMES DAILY WITH MEALS
Status: DISCONTINUED | OUTPATIENT
Start: 2023-02-15 | End: 2023-02-16 | Stop reason: HOSPADM

## 2023-02-15 RX ADMIN — DOCUSATE SODIUM 100 MG: 100 CAPSULE, LIQUID FILLED ORAL at 08:43

## 2023-02-15 RX ADMIN — FERROUS SULFATE TAB EC 324 MG (65 MG FE EQUIVALENT) 324 MG: 324 (65 FE) TABLET DELAYED RESPONSE at 08:44

## 2023-02-15 RX ADMIN — FERROUS SULFATE TAB EC 324 MG (65 MG FE EQUIVALENT) 324 MG: 324 (65 FE) TABLET DELAYED RESPONSE at 18:14

## 2023-02-15 RX ADMIN — IBUPROFEN 600 MG: 600 TABLET ORAL at 08:43

## 2023-02-15 RX ADMIN — IBUPROFEN 600 MG: 600 TABLET ORAL at 01:47

## 2023-02-15 RX ADMIN — PRENATAL VITAMINS-IRON FUMARATE 27 MG IRON-FOLIC ACID 0.8 MG TABLET 1 TABLET: at 08:44

## 2023-02-15 RX ADMIN — IBUPROFEN 600 MG: 600 TABLET ORAL at 18:16

## 2023-02-15 NOTE — PROGRESS NOTES
Hector  Vaginal Delivery Progress Note    Subjective   Subjective  Postpartum Day 1: Vaginal Delivery    The patient feels well.  Her pain is well controlled.   She is ambulating well.  Patient describes her bleeding as thin lochia.    Breastfeeding: without difficulty.    Objective     Objective   Vital Signs Range for the last 24 hours  Temperature: Temp:  [97.9 °F (36.6 °C)-98.8 °F (37.1 °C)] 98.2 °F (36.8 °C)   Temp Source: Temp src: Oral   BP: BP: ()/(48-77) 137/72   Pulse: Heart Rate:  [] 80   Respirations: Resp:  [16-18] 18   SPO2: SpO2:  [96 %-99 %] 98 %   O2 Amount (l/min):     O2 Devices Device (Oxygen Therapy): room air   Weight:       Admit Height:       Physical Exam:  General:  no acute distresss.  Abdomen: Fundus: appropriate, firm, non tender  Extremities: normal, atraumatic, no cyanosis, and trace edema.       Lab results reviewed:  Yes Hgb 10.6 Hct 33.1  Rubella:  No results found for: RUBELLAIGGIN Nurse Transcribed from prenatal record --    Rubella Antibodies, IgG   Date Value Ref Range Status   2022 <0.90 (L) Immune >0.99 index Final     Comment:                                     Non-immune       <0.90                                  Equivocal  0.90 - 0.99                                  Immune           >0.99       Rh Status:    RH type   Date Value Ref Range Status   2023 Positive  Final     Rh Factor   Date Value Ref Range Status   2022 Positive  Final     Comment:     Please note: Prior records for this patient's ABO / Rh type are not  available for additional verification.       Immunizations:   Immunization History   Administered Date(s) Administered   • COVID-19 (PFIZER) PURPLE CAP 2021, 2021   • FluLaval/Fluzone >6mos 10/07/2020   • MMR 2018, 2023       Assessment & Plan   Assessment & Plan    Encounter for induction of labor     (spontaneous vaginal delivery)      Meg Mccrary is Day 1  post-partum  Vaginal,  Spontaneous   .      Plan:  Continue current care. Iron supplementation      Shaye Tipton PA-C  2/15/2023  07:43 EST

## 2023-02-15 NOTE — PLAN OF CARE
Goal Outcome Evaluation:  Plan of Care Reviewed With: patient, spouse        Progress: improving  Outcome Evaluation: VSS. I & O adequate. Minimal complaints of pain. Bonding well with infant. Formula feeding and breastfeeding.

## 2023-02-15 NOTE — PLAN OF CARE
Goal Outcome Evaluation:              Outcome Evaluation: VSS.  Pain well controlled.  Bonding well.

## 2023-02-16 VITALS
BODY MASS INDEX: 32.98 KG/M2 | DIASTOLIC BLOOD PRESSURE: 69 MMHG | HEART RATE: 89 BPM | SYSTOLIC BLOOD PRESSURE: 106 MMHG | TEMPERATURE: 98.4 F | WEIGHT: 180.3 LBS | OXYGEN SATURATION: 96 % | RESPIRATION RATE: 16 BRPM

## 2023-02-16 PROCEDURE — 90686 IIV4 VACC NO PRSV 0.5 ML IM: CPT | Performed by: MIDWIFE

## 2023-02-16 PROCEDURE — 25010000002 INFLUENZA VAC SPLIT QUAD 0.5 ML SUSPENSION PREFILLED SYRINGE: Performed by: MIDWIFE

## 2023-02-16 PROCEDURE — G0008 ADMIN INFLUENZA VIRUS VAC: HCPCS | Performed by: MIDWIFE

## 2023-02-16 PROCEDURE — 0503F POSTPARTUM CARE VISIT: CPT | Performed by: MIDWIFE

## 2023-02-16 RX ORDER — FERROUS SULFATE TAB EC 324 MG (65 MG FE EQUIVALENT) 324 (65 FE) MG
324 TABLET DELAYED RESPONSE ORAL 2 TIMES DAILY WITH MEALS
Qty: 30 TABLET | Refills: 0 | Status: SHIPPED | OUTPATIENT
Start: 2023-02-16 | End: 2023-04-03

## 2023-02-16 RX ORDER — IBUPROFEN 600 MG/1
600 TABLET ORAL EVERY 6 HOURS PRN
Qty: 60 TABLET | Refills: 0 | Status: SHIPPED | OUTPATIENT
Start: 2023-02-16

## 2023-02-16 RX ADMIN — DOCUSATE SODIUM 100 MG: 100 CAPSULE, LIQUID FILLED ORAL at 09:13

## 2023-02-16 RX ADMIN — IBUPROFEN 600 MG: 600 TABLET ORAL at 09:13

## 2023-02-16 RX ADMIN — FERROUS SULFATE TAB EC 324 MG (65 MG FE EQUIVALENT) 324 MG: 324 (65 FE) TABLET DELAYED RESPONSE at 09:13

## 2023-02-16 RX ADMIN — PRENATAL VITAMINS-IRON FUMARATE 27 MG IRON-FOLIC ACID 0.8 MG TABLET 1 TABLET: at 09:13

## 2023-02-16 RX ADMIN — INFLUENZA VIRUS VACCINE 0.5 ML: 15; 15; 15; 15 SUSPENSION INTRAMUSCULAR at 10:52

## 2023-02-16 NOTE — DISCHARGE SUMMARY
Discharge Summary    LAUREL Young  Meg Mccrary  : 2000  MRN: [id number]  CSN: [id number]    Date of Admission: 2023   Date of Discharge:  2023   Delivering Physician: America Michelle        Admission Diagnosis: 1. Encounter for induction of labor [Z34.90]   Discharge Diagnosis: 1. Same as above plus  2. Pregnancy at 40w2d - delivered       Procedures: 2023  - Vaginal, Spontaneous       Hospital Course  Patient is a 22 y.o.  who at 40w2d had a vaginal birth.  Her postpartum course was without complications.  On PPD #2 she was ready for discharge.  She had normal lochia and pain well controlled with oral medications. She is breast feeding . She is undecided regarding birth control.    Infant  male  fetus weighing 3657 g (8 lb 1 oz)   Apgars -  9 @ 1 minute /  9 @ 5 minutes.    Discharge labs  Lab Results   Component Value Date    WBC 9.19 02/15/2023    HGB 10.6 (L) 02/15/2023    HCT 33.1 (L) 02/15/2023     02/15/2023       Discharge Medications     Discharge Medications      New Medications      Instructions Start Date   ferrous sulfate 324 (65 Fe) MG tablet delayed-release EC tablet   324 mg, Oral, 2 Times Daily With Meals      ibuprofen 600 MG tablet  Commonly known as: ADVIL,MOTRIN   600 mg, Oral, Every 6 Hours PRN         Continue These Medications      Instructions Start Date   PRENATAL VITAMINS PO   1 tablet, Oral, Daily             Discharge Disposition Home or Self Care   Condition on Discharge: good   Follow-up: 6 weeks with HARRY Young     Time spent on discharge: 30 minutes or less  America Michelle, APRRONEY  2023   Patient informed of message below.  He verbalized understanding then said that he is off the amlodipine and the lightheadedness has gone away.  Patient reports his B/P reading as 125/70 last night and 157/73 this AM.  Patient aware to continue B/P screening over the weekend and call back on Monday with readings.

## 2023-02-16 NOTE — PLAN OF CARE
Goal Outcome Evaluation:               Patient doing well, bonding with . Pain controlled. She is breastfeeding and supplementing with formula per request. VSS.

## 2023-02-16 NOTE — PLAN OF CARE
Goal Outcome Evaluation:  Plan of Care Reviewed With: patient           Outcome Evaluation: VSS, pain managed adequately, I/Os adequate, positive bonding observed

## 2023-04-03 ENCOUNTER — POSTPARTUM VISIT (OUTPATIENT)
Dept: OBSTETRICS AND GYNECOLOGY | Facility: CLINIC | Age: 23
End: 2023-04-03
Payer: MEDICAID

## 2023-04-03 VITALS
HEIGHT: 63 IN | BODY MASS INDEX: 30.12 KG/M2 | DIASTOLIC BLOOD PRESSURE: 68 MMHG | WEIGHT: 170 LBS | SYSTOLIC BLOOD PRESSURE: 112 MMHG

## 2023-04-03 DIAGNOSIS — Z30.014 ENCOUNTER FOR INITIAL PRESCRIPTION OF INTRAUTERINE CONTRACEPTIVE DEVICE (IUD): Primary | ICD-10-CM

## 2023-04-03 DIAGNOSIS — Z01.419 ENCOUNTER FOR ANNUAL ROUTINE GYNECOLOGICAL EXAMINATION: ICD-10-CM

## 2023-04-03 PROBLEM — Z34.90 ENCOUNTER FOR INDUCTION OF LABOR: Status: RESOLVED | Noted: 2023-02-14 | Resolved: 2023-04-03

## 2023-04-03 PROCEDURE — 0503F POSTPARTUM CARE VISIT: CPT | Performed by: MIDWIFE

## 2023-04-03 RX ORDER — DOCUSATE SODIUM 100 MG/1
100 CAPSULE, LIQUID FILLED ORAL 2 TIMES DAILY
Qty: 60 CAPSULE | Refills: 1 | Status: SHIPPED | OUTPATIENT
Start: 2023-04-03

## 2023-04-03 RX ORDER — COPPER 313.4 MG/1
1 INTRAUTERINE DEVICE INTRAUTERINE ONCE
Qty: 1 EACH | Refills: 0 | Status: SHIPPED | OUTPATIENT
Start: 2023-04-03 | End: 2023-04-03

## 2023-04-03 NOTE — PROGRESS NOTES
"History  Chief Complaint   Patient presents with   • Postpartum Care        Meg Mccrary is a 22 y.o. year old  presenting to be seen for her postpartum visit.  She had a vaginal delivery.    Since delivery she has been sexually active. She has used condoms.  She does not have concerns about post-partum blues/depression.   She is breast feeding .  For ongoing contraception, her plans are IUD - Paraguard. If this isn't approved, she would like Mirena  She has not had a menstrual cycle.  C/O skin near rectum         Physical  /68   Ht 160 cm (63\")   Wt 77.1 kg (170 lb)   BMI 30.11 kg/m²     General:  well developed; well nourished  no acute distress   Abdomen: soft, non-tender; no masses   Pelvis: External genitalia:  normal appearance of the external genitalia including Bartholin's and [place]'s glands.  Vaginal:  normal pink mucosa without prolapse or lesions.  Cervix:  normal appearance. anterior  Uterus:  normal size, shape and consistency. retroverted;  Adnexa:  normal bimanual exam of the adnexa.  Small external hemorrhoid- flat soft        Assessment   1. Normal 6 week postpartum exam  2. Hemorrhoid  3. Contraceptive management     Plan   1. BC options reviewed and compared today: IUD - Paraguard. Will check benefits. If not approved, wants Mirena  2. Pap smear done  3. Follow up for IUD insertion    New Medications Ordered This Visit   Medications   • docusate sodium (Colace) 100 MG capsule     Sig: Take 1 capsule by mouth 2 (Two) Times a Day.     Dispense:  60 capsule     Refill:  1   • Paragard Intrauterine Copper intrauterine device IUD     Si each by Intrauterine route 1 (One) Time for 1 dose.     Dispense:  1 each     Refill:  0          This note was electronically signed.  AMARJIT Downing  4/3/2023  "

## 2023-04-06 LAB — REF LAB TEST METHOD: NORMAL

## 2023-04-13 DIAGNOSIS — N92.6 IRREGULAR MENSES: Primary | ICD-10-CM

## 2023-04-26 ENCOUNTER — OFFICE VISIT (OUTPATIENT)
Dept: OBSTETRICS AND GYNECOLOGY | Facility: CLINIC | Age: 23
End: 2023-04-26
Payer: MEDICAID

## 2023-04-26 ENCOUNTER — LAB (OUTPATIENT)
Dept: LAB | Facility: HOSPITAL | Age: 23
End: 2023-04-26
Payer: MEDICAID

## 2023-04-26 VITALS
SYSTOLIC BLOOD PRESSURE: 120 MMHG | BODY MASS INDEX: 29.77 KG/M2 | HEIGHT: 63 IN | DIASTOLIC BLOOD PRESSURE: 60 MMHG | WEIGHT: 168 LBS

## 2023-04-26 DIAGNOSIS — Z30.430 ENCOUNTER FOR INSERTION OF PARAGARD IUD: Primary | ICD-10-CM

## 2023-04-26 PROCEDURE — 84702 CHORIONIC GONADOTROPIN TEST: CPT | Performed by: OBSTETRICS & GYNECOLOGY

## 2023-04-26 RX ORDER — COPPER 313.4 MG/1
INTRAUTERINE DEVICE INTRAUTERINE ONCE
Status: COMPLETED | OUTPATIENT
Start: 2023-04-26 | End: 2023-04-26

## 2023-04-26 RX ADMIN — COPPER: 313.4 INTRAUTERINE DEVICE INTRAUTERINE at 14:53

## 2023-04-26 NOTE — PROGRESS NOTES
IUD Insertion    No LMP recorded.    Date of procedure:  4/26/2023    Risks and benefits discussed? yes  All questions answered? yes  Consents given by The patient  Written consent obtained? yes    Local anesthesia used:  no    Procedure documentation:    After verifying the patient had a low probability of being pregnant and met the criteria for insertion, a sterile speculum has placed and the cervix was cleansed with an antiseptic solution.  Vaginal discharge was scant.  The anterior lip of the cervix was grasped with a tenaculum and the uterine cavity was gently sounded. There was no difficulty passing the sound through the cervix.  Cervical dilation did not need to be performed prior to placing the IUD.  The uterus was anteverted and sounded to 8 cms.  The ParaGard was then prepared per the manufacturers instructions.    The Paraguard was advanced through the cervical canal until the upper edge of the flange was flush with the external cervix.  The insertion harris was held in place while the insertion tube was withdrawn.  I waited 10-15 seconds for the arms of the IUS to fully open and the device to seat in the cavity.  The harris was removed first followed by the insertion tube.. The string was cut 5 cms in length.  Bleeding from the cervix was scant.    She tolerated the procedure without any difficulty.     Post procedure instructions: It was reviewed that the Paraguard will not alter the timing of when she bleeds but it may increase the quantity of flow and cramps. Efficacy rate of > 98% over the 10 years was discussed.  Spontaneous expulsion rate of 1-2% was also discussed.  If she has any issue with fever or excessive bleeding or pain she is to call the office immediately.  Otherwise I would like to see her back in 5 weeks for f/u appt.    This note was electronically signed.  Divya Mena M.D.

## 2024-01-12 ENCOUNTER — OFFICE VISIT (OUTPATIENT)
Dept: OBSTETRICS AND GYNECOLOGY | Facility: CLINIC | Age: 24
End: 2024-01-12
Payer: MEDICAID

## 2024-01-12 VITALS
HEIGHT: 63 IN | WEIGHT: 149 LBS | DIASTOLIC BLOOD PRESSURE: 60 MMHG | SYSTOLIC BLOOD PRESSURE: 110 MMHG | BODY MASS INDEX: 26.4 KG/M2

## 2024-01-12 DIAGNOSIS — N94.10 DYSPAREUNIA IN FEMALE: Primary | ICD-10-CM

## 2024-01-12 DIAGNOSIS — R68.82 DECREASED LIBIDO: ICD-10-CM

## 2024-01-12 DIAGNOSIS — N93.0 POSTCOITAL BLEEDING: ICD-10-CM

## 2024-01-12 DIAGNOSIS — N95.1 MENOPAUSAL SYMPTOMS: ICD-10-CM

## 2024-01-12 DIAGNOSIS — N92.1 MENORRHAGIA WITH IRREGULAR CYCLE: ICD-10-CM

## 2024-01-12 DIAGNOSIS — N89.8 VAGINAL DRYNESS: ICD-10-CM

## 2024-01-12 PROCEDURE — 99214 OFFICE O/P EST MOD 30 MIN: CPT | Performed by: OBSTETRICS & GYNECOLOGY

## 2024-01-12 PROCEDURE — 1160F RVW MEDS BY RX/DR IN RCRD: CPT | Performed by: OBSTETRICS & GYNECOLOGY

## 2024-01-12 PROCEDURE — 1159F MED LIST DOCD IN RCRD: CPT | Performed by: OBSTETRICS & GYNECOLOGY

## 2024-01-12 NOTE — PROGRESS NOTES
Chief Complaint  Follow-up (Transvaginal ultrasound-check Paragard IUD. Patient advised had pain during intercourse x 3 days ago. )     History of Present Illness:  Patient is 23 y.o.  who presents to [facility] here for evaluation of recent episode of dyspareunia as well as postcoital bleeding.  Patient has ParaGard.  She reports her menstrual cycles will vary in timing.  They are extremely heavy and painful.  She reports however the bleeding and pain is tolerable.  She reports 2 to 3 days ago having the onset of severe pain associated with intercourse.  She had cramping predominantly left lower quadrant.  She also had bloody discharge for 2 days.  She has had no further bleeding since that time.  She does report having menopausal symptoms with decreased libido as well.  She has not had any recent labs.  She reports onset of symptoms postdelivery.  She also reports having vaginal dryness.    History  Past Medical History:   Diagnosis Date    Encounter for insertion of ParaGard IUD 2023    Rubella non-immune status, antepartum 2020    Urinary tract infection      Current Outpatient Medications on File Prior to Visit   Medication Sig Dispense Refill    docusate sodium (Colace) 100 MG capsule Take 1 capsule by mouth 2 (Two) Times a Day. 60 capsule 1    ibuprofen (ADVIL,MOTRIN) 600 MG tablet Take 1 tablet by mouth Every 6 (Six) Hours As Needed for Mild Pain. 60 tablet 0    Paragard Intrauterine Copper intrauterine device IUD 1 each by Intrauterine route 1 (One) Time for 1 dose. 1 each 0    Prenatal Vit-Fe Fumarate-FA (PRENATAL VITAMINS PO) Take 1 tablet by mouth Daily.       No current facility-administered medications on file prior to visit.     Allergies   Allergen Reactions    Fish-Derived Products Rash     History reviewed. No pertinent surgical history.  Family History   Problem Relation Age of Onset    Miscarriages / Stillbirths Mother     Asthma Mother     No Known  "Problems Father     No Known Problems Sister     No Known Problems Brother     No Known Problems Daughter     No Known Problems Son     No Known Problems Maternal Grandmother     No Known Problems Maternal Grandfather     No Known Problems Paternal Grandmother     No Known Problems Paternal Grandfather      Social History     Socioeconomic History    Marital status:      Spouse name: Frederick    Number of children: 2    Years of education: 13    Highest education level: Some college, no degree   Tobacco Use    Smoking status: Never    Smokeless tobacco: Never   Vaping Use    Vaping Use: Never used   Substance and Sexual Activity    Alcohol use: No    Drug use: No    Sexual activity: Yes     Partners: Male     Birth control/protection: I.U.D.       Physical Examination:  Vital Signs: /60   Ht 160 cm (63\")   Wt 67.6 kg (149 lb)   BMI 26.39 kg/m²     General Appearance: alert, appears stated age, and cooperative  Breasts: Not performed.  Abdomen: no masses, no hepatomegaly, no splenomegaly, soft non-tender, no guarding, and no rebound tenderness  Pelvic: Not performed.    Data Review:  The following data was reviewed by: Divya Mena MD on 01/12/2024:     Labs:    Imaging:  US Non-ob Transvaginal (01/12/2024 14:35)   Medical Records:  None    Assessment and Plan   1. Menopausal symptoms  The various options for the management of menopausal symptoms was discussed.  The medical treatment options discussed include HRT, SSRIs, SSNRIs, clonidine, and gabapentin.  The risks and benefits were discussed including the findings from the WHI study.  The increased risk of breast cancer, CAD, stroke, and VTE events were discussed for combination therapy vs the increased risk of CV events and breast cancer not being seen in the estrogen only group.  The lowest effective dose for the shortest duration of treatment was discussed in regards to HRT.  Other alternatives including otc supplements and lifestyle changes were " also discussed.  Local estrogen therapy to relieve atrophic vaginal symptoms was discussed was well as other alternatives.   Labs as noted.  - Follicle Stimulating Hormone  - Estradiol  - Testosterone, Free, Total  - TSH  - T4, Free    2. Dyspareunia in female  Vaginal ultrasound obtained as noted.  Instructions precautions have been given.  Patient may need to consider topical estrogen cream as well.    3. Menorrhagia with irregular cycle  The patient was informed that menstrual flow outside of normal volume, duration, regularity, or frequency is considered abnormal uterine bleeding.  The patient was informed that the normal duration of menstrual flow is usually 5 days and the normal cycle typically lasts between 21-35 days.  The patient was informed that heavy menstrual bleeding has been defined as blood loss greater than 80 mL and given that this is hard to quantify excessive blood loss is based on the patient's perception. The patient was informed that AUB most frequently occurs in women aged 19-39 years as a result of pregnancy, structural lesions such as polyps or fibroids, anovulatory cycles, use of hormonal contraception, and endometrial hyperplasia.  She was counseled that endometrial cancer is less common but may occur.  It is recommended that she have a pregnancy test, CBC, and TSH.  Her pap smear needs to be up to date.  She needs screening for Chlamydia if she feels she is at high risk.  It is also recommended that she have a transvaginal ultrasound for further evaluation.  If anatomic abnormalities are noted then then surgery may be indicated. An endometrial ablation may also be an option to control bleeding in women who have completed childbearing.  The various options were discussed regarding medical management of her bleeding to include the use of nonsteroidal antiinflammatory drugs, progestins, combination oral contraceptives, a levonorgestrel intrauterine device, or tranexamic acid. The patient is  informed if there is no improvement in her symptoms with medical management then she will need additional evaluation to include additional laboratory assessment and endometrial sampling.   Patient desires to continue with her ParaGard at present.  - Follicle Stimulating Hormone  - Estradiol  - Testosterone, Free, Total  - TSH  - T4, Free    4. Postcoital bleeding  Labs as noted.  Transvaginal ultrasound obtained as well.  Plan pending results.    5. Decreased libido  I discussed with the patient the various options for management of her symptoms.  Patient is to consider a trial with estrogen cream as well.    6. Vaginal dryness  Labs as noted.  Plan pending results.  - Follicle Stimulating Hormone  - Estradiol  - Testosterone, Free, Total    Follow Up/Instructions:  Follow up as noted.  Patient was given instructions and counseling regarding her condition or for health maintenance advice. Please see specific information pulled into the AVS if appropriate.     Note: Speech recognition transcription software may have been used to dictate portions of this document.  An attempt at proofreading has been made though minor errors in transcription may still be present.    This note was electronically signed.  Divya Mena M.D.

## 2024-01-21 LAB
ESTRADIOL SERPL-MCNC: 58 PG/ML
FSH SERPL-ACNC: 4.1 MIU/ML
T4 FREE SERPL-MCNC: 1.28 NG/DL (ref 0.93–1.7)
TESTOST FREE SERPL-MCNC: 2 PG/ML (ref 0–4.2)
TESTOST SERPL-MCNC: 17 NG/DL (ref 13–71)
TSH SERPL DL<=0.005 MIU/L-ACNC: 1.38 UIU/ML (ref 0.27–4.2)

## 2024-07-31 ENCOUNTER — OFFICE VISIT (OUTPATIENT)
Dept: OBSTETRICS AND GYNECOLOGY | Facility: CLINIC | Age: 24
End: 2024-07-31

## 2024-07-31 VITALS
SYSTOLIC BLOOD PRESSURE: 102 MMHG | HEIGHT: 63 IN | DIASTOLIC BLOOD PRESSURE: 62 MMHG | WEIGHT: 148.6 LBS | BODY MASS INDEX: 26.33 KG/M2

## 2024-07-31 DIAGNOSIS — Z30.432 ENCOUNTER FOR REMOVAL OF INTRAUTERINE CONTRACEPTIVE DEVICE (IUD): Primary | ICD-10-CM

## 2024-07-31 DIAGNOSIS — Z32.00 POSSIBLE PREGNANCY, NOT CONFIRMED: ICD-10-CM

## 2024-07-31 DIAGNOSIS — Z30.016 ENCOUNTER FOR INITIAL PRESCRIPTION OF TRANSDERMAL PATCH HORMONAL CONTRACEPTIVE DEVICE: ICD-10-CM

## 2024-07-31 RX ORDER — NORELGESTROMIN AND ETHINYL ESTRADIOL 35; 150 UG/MG; UG/MG
1 PATCH TRANSDERMAL WEEKLY
Qty: 3 PATCH | Refills: 12 | Status: SHIPPED | OUTPATIENT
Start: 2024-07-31 | End: 2025-07-31

## 2024-07-31 NOTE — PROGRESS NOTES
Subjective   Chief Complaint   Patient presents with    Follow-up     IUD removal, Negative UPT       Meg Mccrary is a 23 y.o. year old  presenting to be seen for IUD removal  Paragard inserted 2023.  She is having heavy and clotty menses with IUD. Had 2 bleeds in the past month which hadn't happened before  Wants IUD removed and switch to contraceptive patch. No history of stroke, VTE, migraine with aura       Past Medical History:   Diagnosis Date    Encounter for insertion of ParaGard IUD 2023    Rubella non-immune status, antepartum 2020    Urinary tract infection         Current Outpatient Medications:     docusate sodium (Colace) 100 MG capsule, Take 1 capsule by mouth 2 (Two) Times a Day., Disp: 60 capsule, Rfl: 1    ibuprofen (ADVIL,MOTRIN) 600 MG tablet, Take 1 tablet by mouth Every 6 (Six) Hours As Needed for Mild Pain., Disp: 60 tablet, Rfl: 0    norelgestromin-ethinyl estradiol (Xulane) 150-35 MCG/24HR, Place 1 patch on the skin as directed by provider 1 (One) Time Per Week., Disp: 3 patch, Rfl: 12    Paragard Intrauterine Copper intrauterine device IUD, 1 each by Intrauterine route 1 (One) Time for 1 dose., Disp: 1 each, Rfl: 0   Allergies   Allergen Reactions    Fish-Derived Products Rash      History reviewed. No pertinent surgical history.   Social History     Socioeconomic History    Marital status:      Spouse name: Frederick    Number of children: 2    Years of education: 13    Highest education level: Some college, no degree   Tobacco Use    Smoking status: Never    Smokeless tobacco: Never   Vaping Use    Vaping status: Never Used   Substance and Sexual Activity    Alcohol use: No    Drug use: No    Sexual activity: Yes     Partners: Male     Birth control/protection: I.U.D.      Family History   Problem Relation Age of Onset    Miscarriages / Stillbirths Mother     Asthma Mother     No Known Problems Father     No Known Problems Sister     No Known Problems  "Brother     No Known Problems Daughter     No Known Problems Son     No Known Problems Maternal Grandmother     No Known Problems Maternal Grandfather     No Known Problems Paternal Grandmother     No Known Problems Paternal Grandfather        Review of Systems   Constitutional: Negative.    Gastrointestinal: Negative.    Genitourinary:  Positive for menstrual problem. Negative for difficulty urinating and dysuria.           Objective   /62   Ht 160 cm (63\")   Wt 67.4 kg (148 lb 9.6 oz)   BMI 26.32 kg/m²     Physical Exam  Exam conducted with a chaperone present.   Constitutional:       Appearance: Normal appearance. She is well-developed and well-groomed.   Eyes:      General: Lids are normal.      Extraocular Movements: Extraocular movements intact.      Conjunctiva/sclera: Conjunctivae normal.   Genitourinary:     Labia:         Right: No rash, tenderness or lesion.         Left: No rash, tenderness or lesion.       Urethra: No prolapse, urethral pain, urethral swelling or urethral lesion.      Vagina: No vaginal discharge, tenderness or lesions.      Cervix: No cervical motion tenderness, discharge, friability or lesion.      Comments: IUD strings 3 cm  IUD removed without difficulty  Neurological:      Mental Status: She is alert.   Psychiatric:         Attention and Perception: Attention normal.         Mood and Affect: Mood normal.         Speech: Speech normal.         Behavior: Behavior is cooperative.            Result Review :                   Assessment and Plan  Diagnoses and all orders for this visit:    1. Encounter for removal of intrauterine contraceptive device (IUD) (Primary)    2. Encounter for initial prescription of transdermal patch hormonal contraceptive device    3. Possible pregnancy, not confirmed  -     POC Pregnancy, Urine    Other orders  -     norelgestromin-ethinyl estradiol (Xulane) 150-35 MCG/24HR; Place 1 patch on the skin as directed by provider 1 (One) Time Per Week.  " Dispense: 3 patch; Refill: 12      Patient Instructions   Start contraceptive patch first day of upcoming menses  Patch not effective until completes first cycle  RTO prn           This note was electronically signed.    Shaye Tipton PA-C   July 31, 2024

## 2024-07-31 NOTE — PATIENT INSTRUCTIONS
Start contraceptive patch first day of upcoming menses  Patch not effective until completes first cycle  RTO prn

## 2024-07-31 NOTE — PROGRESS NOTES
IUD Removal    Date of procedure:  7/31/2024    Risks and benefits discussed? yes  All questions answered? yes  Consents given by The patient  Written consent obtained? yes  Reason for removal: Heavy and clotty menses      Procedure documentation:    A speculum was placed in order to view the cervix.  A tenaculum did not need to be placed on the anterior cervical lip.  Cervical dilation did not need to be performed in order to access the string.  The IUD string was easily seen.  The string was grasped and the IUD was removed without difficulty.  The IUD did not appear to be adherent to the uterine cavity. It was removed intact.    She tolerated the procedure without any difficulty.     Post procedure instructions: Patient notified to call with heavy bleeding, fever or increasing pain.    Follow up  prn    This note was electronically signed.    Shaye Tipton PA-C  July 31, 2024

## 2024-12-26 ENCOUNTER — TELEPHONE (OUTPATIENT)
Dept: OBSTETRICS AND GYNECOLOGY | Facility: CLINIC | Age: 24
End: 2024-12-26

## 2024-12-26 NOTE — TELEPHONE ENCOUNTER
"    Caller: Meg Monk Demetra \"Meg\"  Female, 24 y.o., 2000  MRN: [id number]  CSN: [id number]  Phone: [phone]  SELF  Relationship to patient:             Type of visit: NEW OB       Additional notes:PT CALLED TO MAKE A NEW OB APPT, PT HAS NO LMP, PT STATES SHE TOOK A PREG TEST ON 12-23-24 AND IT WAS POSITIVE, PT WOULD LIKE A CALL BACK TO MAKE AN APPT         "

## 2024-12-29 ENCOUNTER — HOSPITAL ENCOUNTER (EMERGENCY)
Facility: HOSPITAL | Age: 24
Discharge: HOME | End: 2024-12-29
Attending: EMERGENCY MEDICINE | Admitting: EMERGENCY MEDICINE

## 2024-12-29 ENCOUNTER — APPOINTMENT (OUTPATIENT)
Dept: ULTRASOUND IMAGING | Facility: HOSPITAL | Age: 24
End: 2024-12-29

## 2024-12-29 VITALS
HEART RATE: 74 BPM | BODY MASS INDEX: 25.69 KG/M2 | OXYGEN SATURATION: 98 % | RESPIRATION RATE: 16 BRPM | HEIGHT: 63 IN | WEIGHT: 145 LBS | SYSTOLIC BLOOD PRESSURE: 104 MMHG | DIASTOLIC BLOOD PRESSURE: 84 MMHG | TEMPERATURE: 98.3 F

## 2024-12-29 DIAGNOSIS — R10.2 PELVIC PAIN DURING PREGNANCY: Primary | ICD-10-CM

## 2024-12-29 DIAGNOSIS — O26.899 PELVIC PAIN DURING PREGNANCY: Primary | ICD-10-CM

## 2024-12-29 LAB
ALBUMIN SERPL-MCNC: 4.2 G/DL (ref 3.5–5.2)
ALBUMIN/GLOB SERPL: 1.5 G/DL
ALP SERPL-CCNC: 77 U/L (ref 39–117)
ALT SERPL W P-5'-P-CCNC: 13 U/L (ref 1–33)
ANION GAP SERPL CALCULATED.3IONS-SCNC: 10.5 MMOL/L (ref 5–15)
APTT PPP: 35.7 SECONDS (ref 70–100)
AST SERPL-CCNC: 16 U/L (ref 1–32)
BASOPHILS # BLD AUTO: 0.03 10*3/MM3 (ref 0–0.2)
BASOPHILS NFR BLD AUTO: 0.3 % (ref 0–1.5)
BILIRUB SERPL-MCNC: <0.2 MG/DL (ref 0–1.2)
BILIRUB UR QL STRIP: NEGATIVE
BUN SERPL-MCNC: 10 MG/DL (ref 6–20)
BUN/CREAT SERPL: 13.5 (ref 7–25)
CALCIUM SPEC-SCNC: 9 MG/DL (ref 8.6–10.5)
CHLORIDE SERPL-SCNC: 102 MMOL/L (ref 98–107)
CLARITY UR: CLEAR
CO2 SERPL-SCNC: 23.5 MMOL/L (ref 22–29)
COLOR UR: YELLOW
CREAT SERPL-MCNC: 0.74 MG/DL (ref 0.57–1)
DEPRECATED RDW RBC AUTO: 45.3 FL (ref 37–54)
EGFRCR SERPLBLD CKD-EPI 2021: 116.8 ML/MIN/1.73
EOSINOPHIL # BLD AUTO: 0.1 10*3/MM3 (ref 0–0.4)
EOSINOPHIL NFR BLD AUTO: 1.1 % (ref 0.3–6.2)
ERYTHROCYTE [DISTWIDTH] IN BLOOD BY AUTOMATED COUNT: 14.7 % (ref 12.3–15.4)
GLOBULIN UR ELPH-MCNC: 2.8 GM/DL
GLUCOSE SERPL-MCNC: 92 MG/DL (ref 65–99)
GLUCOSE UR STRIP-MCNC: NEGATIVE MG/DL
HCG INTACT+B SERPL-ACNC: NORMAL MIU/ML
HCT VFR BLD AUTO: 38.1 % (ref 34–46.6)
HGB BLD-MCNC: 12.4 G/DL (ref 12–15.9)
HGB UR QL STRIP.AUTO: NEGATIVE
IMM GRANULOCYTES # BLD AUTO: 0.02 10*3/MM3 (ref 0–0.05)
IMM GRANULOCYTES NFR BLD AUTO: 0.2 % (ref 0–0.5)
INR PPP: 0.97 (ref 0.9–1.1)
KETONES UR QL STRIP: NEGATIVE
LEUKOCYTE ESTERASE UR QL STRIP.AUTO: NEGATIVE
LIPASE SERPL-CCNC: 28 U/L (ref 13–60)
LYMPHOCYTES # BLD AUTO: 2.68 10*3/MM3 (ref 0.7–3.1)
LYMPHOCYTES NFR BLD AUTO: 29.8 % (ref 19.6–45.3)
MCH RBC QN AUTO: 27.4 PG (ref 26.6–33)
MCHC RBC AUTO-ENTMCNC: 32.5 G/DL (ref 31.5–35.7)
MCV RBC AUTO: 84.1 FL (ref 79–97)
MONOCYTES # BLD AUTO: 1.05 10*3/MM3 (ref 0.1–0.9)
MONOCYTES NFR BLD AUTO: 11.7 % (ref 5–12)
NEUTROPHILS NFR BLD AUTO: 5.12 10*3/MM3 (ref 1.7–7)
NEUTROPHILS NFR BLD AUTO: 56.9 % (ref 42.7–76)
NITRITE UR QL STRIP: NEGATIVE
NRBC BLD AUTO-RTO: 0 /100 WBC (ref 0–0.2)
PH UR STRIP.AUTO: 7 [PH] (ref 5–8)
PLATELET # BLD AUTO: 280 10*3/MM3 (ref 140–450)
PMV BLD AUTO: 11.4 FL (ref 6–12)
POTASSIUM SERPL-SCNC: 4.1 MMOL/L (ref 3.5–5.2)
PROT SERPL-MCNC: 7 G/DL (ref 6–8.5)
PROT UR QL STRIP: NEGATIVE
PROTHROMBIN TIME: 13.4 SECONDS (ref 12.3–15.1)
RBC # BLD AUTO: 4.53 10*6/MM3 (ref 3.77–5.28)
SODIUM SERPL-SCNC: 136 MMOL/L (ref 136–145)
SP GR UR STRIP: 1.01 (ref 1–1.03)
UROBILINOGEN UR QL STRIP: NORMAL
WBC NRBC COR # BLD AUTO: 9 10*3/MM3 (ref 3.4–10.8)

## 2024-12-29 PROCEDURE — 76817 TRANSVAGINAL US OBSTETRIC: CPT

## 2024-12-29 PROCEDURE — 85610 PROTHROMBIN TIME: CPT

## 2024-12-29 PROCEDURE — 87086 URINE CULTURE/COLONY COUNT: CPT

## 2024-12-29 PROCEDURE — 84702 CHORIONIC GONADOTROPIN TEST: CPT

## 2024-12-29 PROCEDURE — 80053 COMPREHEN METABOLIC PANEL: CPT

## 2024-12-29 PROCEDURE — 36415 COLL VENOUS BLD VENIPUNCTURE: CPT

## 2024-12-29 PROCEDURE — 83690 ASSAY OF LIPASE: CPT

## 2024-12-29 PROCEDURE — 85730 THROMBOPLASTIN TIME PARTIAL: CPT

## 2024-12-29 PROCEDURE — 81003 URINALYSIS AUTO W/O SCOPE: CPT | Performed by: EMERGENCY MEDICINE

## 2024-12-29 PROCEDURE — 85025 COMPLETE CBC W/AUTO DIFF WBC: CPT

## 2024-12-29 PROCEDURE — 99284 EMERGENCY DEPT VISIT MOD MDM: CPT | Performed by: EMERGENCY MEDICINE

## 2024-12-29 NOTE — ED PROVIDER NOTES
EMERGENCY DEPARTMENT ENCOUNTER    Pt Name: Meg Mccrary  MRN: [id number]  Pt :   2000  Room Number:    Date of encounter:  2024  PCP: Provider, No Known  ED Provider: Eric Zamora PA-C    Historian: Patient, nursing notes      HPI:  Chief Complaint: Abdominal pain, vaginal bleeding        Context: Meg Mccrary is a 24 y.o. female  who presents to the ED c/o abdominal cramps described as lower pelvic cramps and lower back pain.  Patient states she is currently pregnant with EGA of 6 weeks.  Patient denied any vaginal bleeding, dysuria, urinary frequency or urgency, or unusual vaginal discharge.      PAST MEDICAL HISTORY  Past Medical History:   Diagnosis Date    Encounter for insertion of ParaGard IUD 2023    Rubella non-immune status, antepartum 2020    Urinary tract infection          PAST SURGICAL HISTORY  History reviewed. No pertinent surgical history.      FAMILY HISTORY  Family History   Problem Relation Age of Onset    Miscarriages / Stillbirths Mother     Asthma Mother     No Known Problems Father     No Known Problems Sister     No Known Problems Brother     No Known Problems Daughter     No Known Problems Son     No Known Problems Maternal Grandmother     No Known Problems Maternal Grandfather     No Known Problems Paternal Grandmother     No Known Problems Paternal Grandfather          SOCIAL HISTORY  Social History     Socioeconomic History    Marital status:      Spouse name: Frederick    Number of children: 2    Years of education: 13    Highest education level: Some college, no degree   Tobacco Use    Smoking status: Never    Smokeless tobacco: Never   Vaping Use    Vaping status: Never Used   Substance and Sexual Activity    Alcohol use: No    Drug use: No    Sexual activity: Yes     Partners: Male     Birth control/protection: I.U.D.         ALLERGIES  Fish-derived products        REVIEW OF SYSTEMS  Review of Systems   Constitutional:   Negative for chills and fever.   HENT:  Negative for congestion and sore throat.    Respiratory:  Negative for cough and shortness of breath.    Cardiovascular:  Negative for chest pain.   Gastrointestinal:  Negative for abdominal pain, nausea and vomiting.   Genitourinary:  Positive for pelvic pain. Negative for dysuria.   Musculoskeletal:  Positive for back pain.   Skin:  Negative for wound.   Neurological:  Negative for dizziness and headaches.   Psychiatric/Behavioral:  Negative for confusion.    All other systems reviewed and are negative.         All systems reviewed and negative except for those discussed in HPI.       PHYSICAL EXAM    I have reviewed the triage vital signs and nursing notes.    ED Triage Vitals [12/29/24 1826]   Temp Heart Rate Resp BP SpO2   98.3 °F (36.8 °C) 76 18 107/56 99 %      Temp src Heart Rate Source Patient Position BP Location FiO2 (%)   Oral Monitor Sitting Left arm --       Physical Exam  Vitals and nursing note reviewed.   Constitutional:       General: She is not in acute distress.     Appearance: She is not ill-appearing, toxic-appearing or diaphoretic.   HENT:      Head: Normocephalic and atraumatic.      Mouth/Throat:      Mouth: Mucous membranes are moist.      Pharynx: Oropharynx is clear.   Eyes:      Extraocular Movements: Extraocular movements intact.   Cardiovascular:      Rate and Rhythm: Normal rate.      Heart sounds: Normal heart sounds.   Pulmonary:      Effort: Pulmonary effort is normal. No respiratory distress.      Breath sounds: Normal breath sounds.   Abdominal:      Tenderness: There is abdominal tenderness in the right lower quadrant, suprapubic area and left lower quadrant. There is no right CVA tenderness, left CVA tenderness, guarding or rebound. Negative signs include Treviño's sign and McBurney's sign.   Skin:     General: Skin is warm and dry.      Findings: No rash.   Neurological:      Mental Status: She is alert.             LAB RESULTS  Recent  Results (from the past 24 hours)   Urinalysis With Microscopic If Indicated (No Culture) - Urine, Clean Catch    Collection Time: 12/29/24  6:46 PM    Specimen: Urine, Clean Catch   Result Value Ref Range    Color, UA Yellow Yellow, Straw    Appearance, UA Clear Clear    pH, UA 7.0 5.0 - 8.0    Specific Gravity, UA 1.008 1.005 - 1.030    Glucose, UA Negative Negative    Ketones, UA Negative Negative    Bilirubin, UA Negative Negative    Blood, UA Negative Negative    Protein, UA Negative Negative    Leuk Esterase, UA Negative Negative    Nitrite, UA Negative Negative    Urobilinogen, UA 0.2 E.U./dL 0.2 - 1.0 E.U./dL   Comprehensive Metabolic Panel    Collection Time: 12/29/24  6:55 PM    Specimen: Blood   Result Value Ref Range    Glucose 92 65 - 99 mg/dL    BUN 10 6 - 20 mg/dL    Creatinine 0.74 0.57 - 1.00 mg/dL    Sodium 136 136 - 145 mmol/L    Potassium 4.1 3.5 - 5.2 mmol/L    Chloride 102 98 - 107 mmol/L    CO2 23.5 22.0 - 29.0 mmol/L    Calcium 9.0 8.6 - 10.5 mg/dL    Total Protein 7.0 6.0 - 8.5 g/dL    Albumin 4.2 3.5 - 5.2 g/dL    ALT (SGPT) 13 1 - 33 U/L    AST (SGOT) 16 1 - 32 U/L    Alkaline Phosphatase 77 39 - 117 U/L    Total Bilirubin <0.2 0.0 - 1.2 mg/dL    Globulin 2.8 gm/dL    A/G Ratio 1.5 g/dL    BUN/Creatinine Ratio 13.5 7.0 - 25.0    Anion Gap 10.5 5.0 - 15.0 mmol/L    eGFR 116.8 >60.0 mL/min/1.73   Protime-INR    Collection Time: 12/29/24  6:55 PM    Specimen: Blood   Result Value Ref Range    Protime 13.4 12.3 - 15.1 Seconds    INR 0.97 0.90 - 1.10   aPTT    Collection Time: 12/29/24  6:55 PM    Specimen: Blood   Result Value Ref Range    PTT 35.7 (L) 70.0 - 100.0 seconds   hCG, Quantitative, Pregnancy    Collection Time: 12/29/24  6:55 PM    Specimen: Blood   Result Value Ref Range    HCG Quantitative 24,037.00 mIU/mL   CBC Auto Differential    Collection Time: 12/29/24  6:55 PM    Specimen: Blood   Result Value Ref Range    WBC 9.00 3.40 - 10.80 10*3/mm3    RBC 4.53 3.77 - 5.28 10*6/mm3     Hemoglobin 12.4 12.0 - 15.9 g/dL    Hematocrit 38.1 34.0 - 46.6 %    MCV 84.1 79.0 - 97.0 fL    MCH 27.4 26.6 - 33.0 pg    MCHC 32.5 31.5 - 35.7 g/dL    RDW 14.7 12.3 - 15.4 %    RDW-SD 45.3 37.0 - 54.0 fl    MPV 11.4 6.0 - 12.0 fL    Platelets 280 140 - 450 10*3/mm3    Neutrophil % 56.9 42.7 - 76.0 %    Lymphocyte % 29.8 19.6 - 45.3 %    Monocyte % 11.7 5.0 - 12.0 %    Eosinophil % 1.1 0.3 - 6.2 %    Basophil % 0.3 0.0 - 1.5 %    Immature Grans % 0.2 0.0 - 0.5 %    Neutrophils, Absolute 5.12 1.70 - 7.00 10*3/mm3    Lymphocytes, Absolute 2.68 0.70 - 3.10 10*3/mm3    Monocytes, Absolute 1.05 (H) 0.10 - 0.90 10*3/mm3    Eosinophils, Absolute 0.10 0.00 - 0.40 10*3/mm3    Basophils, Absolute 0.03 0.00 - 0.20 10*3/mm3    Immature Grans, Absolute 0.02 0.00 - 0.05 10*3/mm3    nRBC 0.0 0.0 - 0.2 /100 WBC   Lipase    Collection Time: 12/29/24  6:55 PM    Specimen: Blood   Result Value Ref Range    Lipase 28 13 - 60 U/L       If labs were ordered, I independently reviewed the results and considered them in treating the patient.        RADIOLOGY  US Ob Transvaginal    Result Date: 12/29/2024  FINAL REPORT TECHNIQUE: null CLINICAL HISTORY: pregnant, EGA 8 weeks, abd/low back pain/cramps. COMPARISON: null FINDINGS: Exam: Obstetrical ultrasound. Comparison: None Clinical history: EGA 8 weeks. Abdominal-low back pain, cramps. Pregnant. Findings: Selected images from an obstetrical ultrasound are provided for interpretation. Transvaginal imaging were performed. The ovaries were evaluated with Doppler color and spectral imaging. No uterine fibroid identified. Intrauterine gestational sac and yolk sac identified confirming intrauterine pregnancy. Mean sac diameter of 1.51 cm corresponds to 6 week 3 day pregnancy. No fetal pole identified at this time. The right ovary measures 2.4 x 1.9 x 1.6 cm and has a normal appearance. The left ovary measures 3.3 x 2.2 x 2.4 cm and has a normal appearance. Doppler color and spectral flow is seen  to both ovaries and no ovarian torsion is identified at this time. No free fluid identified.     Impression: 1. Intrauterine gestational sac and yolk sac confirming intrauterine pregnancy. Mean sac diameter of 1.51 cm corresponds to 6 week 3 day pregnancy. LEIDY of 08/21/2025. No fetal pole identified at this time which may indicate a very early pregnancy or a failed pregnancy. Close clinical follow-up with serial beta hCG recommended. Short-term repeat ultrasound could also be performed. Authenticated and Electronically Signed by Jessica Fulton MD on 12/29/2024 08:17:37 PM     I ordered and independently reviewed the above noted radiographic studies.      I viewed images of transvaginal ultrasound which showed intrauterine pregnancy, no obvious ovarian abnormalities per my independent interpretation.    See radiologist's dictation for official interpretation.        PROCEDURES    Procedures    No orders to display       MEDICATIONS GIVEN IN ER    Medications - No data to display      MEDICAL DECISION MAKING, PROGRESS, and CONSULTS    All labs, if obtained, have been independently reviewed by me.  All radiology studies, if obtained, have been reviewed by me and the radiologist dictating the report.  All EKG's, if obtained, have been independently viewed and interpreted by me/my attending physician.      Discussion below represents my analysis of pertinent findings related to patient's condition, differential diagnosis, treatment plan and final disposition.    Patient is a 24-year-old female presenting the emergency department for evaluation of lower pelvic and lower back pain and cramps in the setting of pregnancy and denying vaginal bleeding.  Extensive workup was initiated.  Patient did have mild bilateral lower abdominal pain and lower back tenderness but certainly no concern for a toxic or acute abdomen.  She is resting comfortably in the exam chair and vital signs and pulse oximetry as independently interpreted  by me are all stable and within normal limits.  Last menstrual cycle known late October.  Per my chart review patient is O+ blood type.  Urinalysis was clear CBC showed no leukocytosis CMP clinically unremarkable lipase normal lowering suspicion for pancreatitis hCG quantitative level 24,000.  Transvaginal ultrasound per radiologist report did show IUP with no other major concerning findings, normal ovaries with established blood flow bilaterally.  I consulted with OB/GYN on-call Dr. Call who agreed to help arrange for the patient to get very close follow-up in their office for possible repeat hCG and ultrasounds.  I explained this to the patient she understands to follow-up with their office first thing tomorrow morning.  She verbalized understanding of the strict ED return precautions presented.  In the absence of vaginal bleeding, lower suspicion for threatened miscarriage or spontaneous  at this time.  No suspicion for UTI given urinalysis.                     Differential diagnosis:    Differential diagnosis included but was not limited to threatened miscarriage, spontaneous , pregnancy complication, ectopic pregnancy, UTI      Additional sources:    - Discussed/ obtained information from independent historians: Patient's  at bedside in addition to patient    - External (non-ED) record review: I reviewed the patient's OB/GYN records from Dr. Mena.  This showed from office visit in 2024 patient had intrauterine IUD removal in July.    - Chronic or social conditions impacting care: None    Orders placed during this visit:  Orders Placed This Encounter   Procedures    Urine Culture - Urine,    US Ob Transvaginal    Urinalysis With Microscopic If Indicated (No Culture) - Urine, Clean Catch    Comprehensive Metabolic Panel    Protime-INR    aPTT    hCG, Quantitative, Pregnancy    CBC Auto Differential    Lipase    CBC & Differential         Additional orders considered but not  ordered: None      ED Course:    Consultants: OB/GYN on-call Dr. Call    ED Course as of 12/30/24 1607   Sun Dec 29, 2024   2205 Per my chart review from lab on March 2018 the patient is O+ blood type and therefore does not require RhoGAM [TG]      ED Course User Index  [TG] Eric Zamora PA-C              Shared Decision Making:  After my consideration of clinical presentation and any laboratory/radiology studies obtained, I discussed the findings with the patient/patient representative who is in agreement with the treatment plan and the final disposition.   Risks and benefits of discharge and/or observation/admission were discussed.       AS OF 16:07 EST VITALS:    BP - 104/84  HR - 74  TEMP - 98.3 °F (36.8 °C) (Oral)  O2 SATS - 98%                  DIAGNOSIS  Final diagnoses:   Pelvic pain during pregnancy         DISPOSITION  Discharge home      Please note that portions of this document were completed with voice recognition software.      Eric Zamora PA-C  12/30/24 1600

## 2024-12-30 ENCOUNTER — TELEPHONE (OUTPATIENT)
Dept: OBSTETRICS AND GYNECOLOGY | Facility: CLINIC | Age: 24
End: 2024-12-30

## 2024-12-30 NOTE — TELEPHONE ENCOUNTER
Patient having pain, ultrasound showed no fetal pole. Patient needs to be worked in today for appointment with ultrasound for evaluation.

## 2024-12-30 NOTE — DISCHARGE INSTRUCTIONS
Return to the ED immediately for any worsening of pain or new symptoms otherwise follow up with your OBGYN within 48 hours.

## 2024-12-30 NOTE — TELEPHONE ENCOUNTER
Provider: KENA RAMIREZ    Caller: Meg Long    Relationship to Patient: Self    Pharmacy: [facility]    Phone Number: [phone]    Reason for Call: NEW OB PT HAD TO GO TO  ER YESTERDAY DUE TO PAIN, NO BLEEDING, THEY DID LABS AND U/S ADV HER TO SEE HER OBGYN WITHING 48 HRS - PT STILL HAVING PAIN, WHEN SHE GETS UP PAIN LEVEL 6 NO BLEEDING  - PER OFFICE SEND NOTE BACK TO CLINICAL    When was the patient last seen: 07-31

## 2024-12-31 LAB — BACTERIA SPEC AEROBE CULT: NORMAL

## 2025-01-13 ENCOUNTER — INITIAL PRENATAL (OUTPATIENT)
Dept: OBSTETRICS AND GYNECOLOGY | Facility: CLINIC | Age: 25
End: 2025-01-13

## 2025-01-13 VITALS — BODY MASS INDEX: 25.15 KG/M2 | DIASTOLIC BLOOD PRESSURE: 60 MMHG | SYSTOLIC BLOOD PRESSURE: 108 MMHG | WEIGHT: 142 LBS

## 2025-01-13 DIAGNOSIS — O36.80X0 ENCOUNTER TO DETERMINE FETAL VIABILITY OF PREGNANCY, SINGLE OR UNSPECIFIED FETUS: ICD-10-CM

## 2025-01-13 DIAGNOSIS — Z34.91 PRENATAL CARE IN FIRST TRIMESTER: Primary | ICD-10-CM

## 2025-01-13 DIAGNOSIS — O21.9 NAUSEA AND VOMITING DURING PREGNANCY: ICD-10-CM

## 2025-01-13 RX ORDER — DIPHENHYDRAMINE HYDROCHLORIDE 25 MG/1
25 CAPSULE ORAL NIGHTLY
Qty: 30 TABLET | Refills: 5 | Status: SHIPPED | OUTPATIENT
Start: 2025-01-13

## 2025-01-13 RX ORDER — METOCLOPRAMIDE 10 MG/1
10 TABLET ORAL
Qty: 45 TABLET | Refills: 5 | Status: SHIPPED | OUTPATIENT
Start: 2025-01-13

## 2025-01-13 NOTE — PROGRESS NOTES
New Pregnancy Visit    Subjective   Chief Complaint   Patient presents with    Initial Prenatal Visit     TVS for viability, patient complains of nausea.        Meg Mccrary is a 24 y.o. year old .  Patient's last menstrual period was 2024.  She presents to initiate prenatal care with our group today.     No major issues.  She does have nausea and emesis.   x 3.  SAB x 1.  Most recent delivery was 2023.  Pelvis proven to 8 pounds 1 ounce.    Social History    Tobacco Use      Smoking status: Never      Smokeless tobacco: Never      Current Outpatient Medications on File Prior to Visit   Medication Sig Dispense Refill    Prenatal Multivit-Min-Fe-FA (PRENATAL 1 + IRON PO)       [DISCONTINUED] docusate sodium (Colace) 100 MG capsule Take 1 capsule by mouth 2 (Two) Times a Day. 60 capsule 1    [DISCONTINUED] ibuprofen (ADVIL,MOTRIN) 600 MG tablet Take 1 tablet by mouth Every 6 (Six) Hours As Needed for Mild Pain. 60 tablet 0    [DISCONTINUED] norelgestromin-ethinyl estradiol (Xulane) 150-35 MCG/24HR Place 1 patch on the skin as directed by provider 1 (One) Time Per Week. 3 patch 12    [DISCONTINUED] Paragard Intrauterine Copper intrauterine device IUD 1 each by Intrauterine route 1 (One) Time for 1 dose. 1 each 0     No current facility-administered medications on file prior to visit.          Objective   /60   Wt 64.4 kg (142 lb)   LMP 2024   BMI 25.15 kg/m²   Physical Exam:  Normal, gestational age-appropriate exam today        Medical Decision Making:    Lab Review:   No data reviewed    Note Review:  Delivery note     Imaging Review:  Pelvic ultrasound report  Assessment   IUP at 8+1 weeks  Supervision of high risk pregnancy  Unplanned pregnancy  Nausea and emesis  Desire for permanent sterilization     Plan    The problem list for pregnancy was initiated today  Tests/Orders/Rx for today:  Orders Placed This Encounter   Procedures    Chlamydia trachomatis,  Neisseria gonorrhoeae, PCR - Urine, Urine, Clean Catch     Order Specific Question:   Release to patient     Answer:   Routine Release [[id number]]    Urine Drug Screen - Urine, Clean Catch     Order Specific Question:   Release to patient     Answer:   Routine Release [[id number]]    OB Panel With HIV and RPR     Order Specific Question:   Release to patient     Answer:   Routine Release [[id number]]    [id number] PLUS Core+SCA - Blood,     Order Specific Question:   [facility] Date of last menstrual period or estimated date of delivery (corresponding to calculation method):     Answer:   8/27/2025     Order Specific Question:   [facility] Gestational age calculation method:     Answer:   LEIDY,EDC     Order Specific Question:   Release to patient     Answer:   Routine Release [[id number]]       Medication Management: B6/Unisom/Reglan    Testing for GC / Chlamydia / trichomonas was done today  Genetic testing reviewed: NIPT ([id number])  Information reviewed: exercise in pregnancy, nutrition in pregnancy, weight gain in pregnancy, work and travel restrictions during pregnancy, list of OTC medications acceptable in pregnancy, and call coverage groups    Follow up: 4 week(s)    Nba Call MD  Obstetrics and Gynecology  [facility]

## 2025-01-14 LAB
ABO GROUP BLD: ABNORMAL
AMPHETAMINES UR QL SCN: NEGATIVE NG/ML
BARBITURATES UR QL SCN: NEGATIVE NG/ML
BASOPHILS # BLD AUTO: 0 X10E3/UL (ref 0–0.2)
BASOPHILS NFR BLD AUTO: 0 %
BENZODIAZ UR QL SCN: NEGATIVE NG/ML
BLD GP AB SCN SERPL QL: NEGATIVE
BZE UR QL SCN: NEGATIVE NG/ML
C TRACH RRNA SPEC QL NAA+PROBE: NEGATIVE
CANNABINOIDS UR QL SCN: NEGATIVE NG/ML
CREAT UR-MCNC: 174.1 MG/DL (ref 20–300)
EOSINOPHIL # BLD AUTO: 0 X10E3/UL (ref 0–0.4)
EOSINOPHIL NFR BLD AUTO: 1 %
ERYTHROCYTE [DISTWIDTH] IN BLOOD BY AUTOMATED COUNT: 14 % (ref 11.7–15.4)
HBV SURFACE AG SERPL QL IA: NEGATIVE
HCT VFR BLD AUTO: 37.9 % (ref 34–46.6)
HCV AB SERPL QL IA: NORMAL
HCV IGG SERPL QL IA: NON REACTIVE
HGB BLD-MCNC: 12.2 G/DL (ref 11.1–15.9)
HIV 1+2 AB+HIV1 P24 AG SERPL QL IA: NON REACTIVE
IMM GRANULOCYTES # BLD AUTO: 0 X10E3/UL (ref 0–0.1)
IMM GRANULOCYTES NFR BLD AUTO: 0 %
LABORATORY COMMENT REPORT: NORMAL
LYMPHOCYTES # BLD AUTO: 1.9 X10E3/UL (ref 0.7–3.1)
LYMPHOCYTES NFR BLD AUTO: 29 %
MCH RBC QN AUTO: 27.7 PG (ref 26.6–33)
MCHC RBC AUTO-ENTMCNC: 32.2 G/DL (ref 31.5–35.7)
MCV RBC AUTO: 86 FL (ref 79–97)
METHADONE UR QL SCN: NEGATIVE NG/ML
MONOCYTES # BLD AUTO: 0.6 X10E3/UL (ref 0.1–0.9)
MONOCYTES NFR BLD AUTO: 9 %
N GONORRHOEA RRNA SPEC QL NAA+PROBE: NEGATIVE
NEUTROPHILS # BLD AUTO: 4 X10E3/UL (ref 1.4–7)
NEUTROPHILS NFR BLD AUTO: 61 %
OPIATES UR QL SCN: NEGATIVE NG/ML
OXYCODONE+OXYMORPHONE UR QL SCN: NEGATIVE NG/ML
PCP UR QL: NEGATIVE NG/ML
PH UR: 8.1 [PH] (ref 4.5–8.9)
PLATELET # BLD AUTO: 284 X10E3/UL (ref 150–450)
PROPOXYPH UR QL SCN: NEGATIVE NG/ML
RBC # BLD AUTO: 4.41 X10E6/UL (ref 3.77–5.28)
RH BLD: POSITIVE
RPR SER QL: NON REACTIVE
RUBV IGG SERPL IA-ACNC: <0.9 INDEX
WBC # BLD AUTO: 6.6 X10E3/UL (ref 3.4–10.8)

## 2025-01-21 ENCOUNTER — TELEPHONE (OUTPATIENT)
Dept: OBSTETRICS AND GYNECOLOGY | Facility: CLINIC | Age: 25
End: 2025-01-21

## 2025-01-21 NOTE — TELEPHONE ENCOUNTER
"Provider: Divya Mena MD     Caller: Meg Mosleyjesus \"Meg\"  Female, 24 y.o., 2000  MRN: [id number]  CSN: [id number]  Phone: [phone]    Relationship to Patient: SELF            Reason for Call: PT CALLED AND STATES SHE FELL THIS MORNING GOING TO WORK ON A SHEET OF ICE AND LANDED ON HER HIP, PT REPORTS MILD BACK PAIN AND HIP PAIN WITH SOME CRAMPING, PT WOULD LIKE A CALL BACK TO BE ADVISED      Pregnant: (9w2d, 8/24/25   "

## 2025-01-22 NOTE — TELEPHONE ENCOUNTER
Spoke with patient and her hip and back are mostly what is hurting from the fall. She has no bleeding. Advised to see Family

## 2025-01-22 NOTE — TELEPHONE ENCOUNTER
Hub staff attempted to follow warm transfer process and was unsuccessful     Caller: Meg Long    Relationship to patient: Self    Best call back number: [phone]  CALL ANYTIME IT IS OKAY TO LVM.    Patient is needing: PATIENT CALLED REQUESTING TO BE SEEN TODAY DUE TO MILD CRAMPING AND BACK PAIN.    PATIENT STATED UNABLE TO GO TO EMERGENCY ROOM YESTERDAY DUE TO NOT HAVING CHILDCARE.

## 2025-02-10 ENCOUNTER — ROUTINE PRENATAL (OUTPATIENT)
Dept: OBSTETRICS AND GYNECOLOGY | Facility: CLINIC | Age: 25
End: 2025-02-10
Payer: COMMERCIAL

## 2025-02-10 VITALS — SYSTOLIC BLOOD PRESSURE: 118 MMHG | WEIGHT: 146 LBS | DIASTOLIC BLOOD PRESSURE: 60 MMHG | BODY MASS INDEX: 25.86 KG/M2

## 2025-02-10 DIAGNOSIS — O21.9 NAUSEA AND VOMITING DURING PREGNANCY: ICD-10-CM

## 2025-02-10 DIAGNOSIS — O09.91 ENCOUNTER FOR SUPERVISION OF HIGH RISK PREGNANCY IN FIRST TRIMESTER, ANTEPARTUM: Primary | ICD-10-CM

## 2025-02-10 DIAGNOSIS — O09.891 SHORT INTERVAL BETWEEN PREGNANCIES AFFECTING PREGNANCY IN FIRST TRIMESTER, ANTEPARTUM: ICD-10-CM

## 2025-02-10 DIAGNOSIS — Z30.2 REQUEST FOR STERILIZATION: ICD-10-CM

## 2025-02-10 NOTE — PROGRESS NOTES
Chief Complaint  Routine Prenatal Visit (No complaints. )    History of Present Illness:  Meg is a  currently at 12w1d who presents today with no significant complaints other than continued nausea and emesis.  Patient has been taking her vitamin B6 and Unisom at night.  She has had some improvement in her symptoms.  She has not been having to take her Reglan consistently.  She denies any vaginal bleeding or spotting.  She did have previous labs as noted.  She is desiring genetic screening today.  Patient is desiring tubal ligation as well.    Exam:  Vitals:  See prenatal flowsheet as noted and reviewed  General: Alert, cooperative, and does not appear in any distress  Abdomen:   See prenatal flowsheet as noted and reviewed    Uterus gravid, non-tender; no palpable masses    No guarding or rebound tenderness  Pelvic:  See prenatal flowsheet as noted and reviewed  Ext:  See prenatal flowsheet as noted and reviewed    Moves extremities well, no cyanosis and no redness  Urine:  See prenatal flowsheet as noted and reviewed    Data Review:  The following data was reviewed by: Divya Mena MD on 02/10/2025:  Prenatal Labs:  Lab Results   Component Value Date    HGB 12.2 2025    RUBELLAABIGG <0.90 (L) 2025    HEPBSAG Negative 2025    ABO O 2025    RH Positive 2025    ABSCRN Negative 2025    [id number] Non Reactive 2025    HEPCVIRUSABY <0.1 2022    STREPGPB Negative 2023    URINECX 25,000 CFU/mL Normal Urogenital Trupti 2024       Initial Prenatal on 2025   Component Date Value    Amphetamine, Urine Qual 2025 Negative     Barbiturates Screen, Uri* 2025 Negative     Benzodiazepine Screen, U* 2025 Negative     THC Screen, Urine 2025 Negative     Cocaine Screen, Urine 2025 Negative     Opiate Screen, Urine 2025 Negative     Oxycodone/Oxymorphone, U* 2025 Negative     Phencyclidine (PCP), Uri* 2025 Negative      Methadone Screen, Urine 01/13/2025 Negative     Propoxyphene Screen 01/13/2025 Negative     Creatinine, Urine 01/13/2025 174.1     pH, UA 01/13/2025 8.1     Please note 01/13/2025 Comment     Chlamydia trachomatis, N* 01/13/2025 Negative     Neisseria gonorrhoeae, N* 01/13/2025 Negative     Hepatitis B Surface Ag 01/13/2025 Negative     Hepatitis C Ab 01/13/2025 Non Reactive     RPR 01/13/2025 Non Reactive     Rubella Antibodies, IgG 01/13/2025 <0.90 (L)     ABO Type 01/13/2025 O     Rh Factor 01/13/2025 Positive     Antibody Screen 01/13/2025 Negative     HIV Screen 4th Gen w/RFX* 01/13/2025 Non Reactive     WBC 01/13/2025 6.6     RBC 01/13/2025 4.41     Hemoglobin 01/13/2025 12.2     Hematocrit 01/13/2025 37.9     MCV 01/13/2025 86     MCH 01/13/2025 27.7     MCHC 01/13/2025 32.2     RDW 01/13/2025 14.0     Platelets 01/13/2025 284     Neutrophil Rel % 01/13/2025 61     Lymphocyte Rel % 01/13/2025 29     Monocyte Rel % 01/13/2025 9     Eosinophil Rel % 01/13/2025 1     Basophil Rel % 01/13/2025 0     Neutrophils Absolute 01/13/2025 4.0     Lymphocytes Absolute 01/13/2025 1.9     Monocytes Absolute 01/13/2025 0.6     Eosinophils Absolute 01/13/2025 0.0     Basophils Absolute 01/13/2025 0.0     Immature Granulocyte Rel* 01/13/2025 0     Immature Grans Absolute 01/13/2025 0.0     Interpretation 01/13/2025 Comment      Imaging:  US Ob Transvaginal  Impression:   IUP measuring 8+1 weeks with appropriate fetal cardiac activity.  LEIDY is   set at 08/24/2025.  The cervix and bilateral ovaries are normal in   appearance. No free fluid in the cul-de-sac.    Nba Call MD  Obstetrics and Gynecology  [facility]      Medical Records:  None    Assessment and Plan:  Problem List Items Addressed This Visit    None  Visit Diagnoses       Encounter for supervision of high risk pregnancy in first trimester, antepartum    -  Primary  Topics discussed:     ab precautions  genetic screening - Today we discussed  genetic testing.  She is aware that the MSAFP-4 and NIPT - [id number] is a screening test.  A screening test is not a diagnostic test.  This means that a negative test does not guarantee an unaffected fetus and a positive test does not mean the fetus has the condition for which the test is being performed.  If the test returns positive, a diagnostic test should be consider to determine if the fetus in fact has the condition.  After considering the options previously presented, she is interested in having genetic testing performed.  GERD management  kick counts and fetal movement  PIH precautions  tubal risks, benefits  Labs today as noted.  Patient to consider MSAFP next visit if desired.    Relevant Orders    [id number] PLUS Core+SCA - Blood,    Nausea and vomiting during pregnancy      Patient to continue her vitamin B6 and Unisom nightly.  She is to continue Reglan 10 mg 30 minutes prior to her meals and nightly as needed.  Call if worsening and/or changes in her symptoms.    Request for sterilization      Patient is desiring permanent surgical sterilization.  Patient to sign tubal papers at 30 to 32 weeks gestation.    Short interval between pregnancies affecting pregnancy in first trimester, antepartum      Labs today as noted.  Patient to follow-up as discussed.          Follow Up/Instructions:  Follow up as scheduled.  Patient was given instructions and counseling regarding her condition or for health maintenance advice. Please see specific information pulled into the AVS if appropriate.     Note: Speech recognition transcription software may have been used to dictate portions of this document.  An attempt at proofreading has been made though minor errors in transcription may still be present.    This note was electronically signed.  Divya Mena M.D.

## 2025-02-14 LAB
CFDNA.FET/CFDNA.TOTAL SFR FETUS: NORMAL %
CITATION REF LAB TEST: NORMAL
FET 13+18+21+X+Y ANEUP PLAS.CFDNA: NEGATIVE
FET CHR 21 TS PLAS.CFDNA QL: NEGATIVE
FET MS X RISK WBC.DNA+CFDNA QL: NOT DETECTED
FET SEX PLAS.CFDNA DOSAGE CFDNA: NORMAL
FET TS 13 RISK PLAS.CFDNA QL: NEGATIVE
FET TS 18 RISK WBC.DNA+CFDNA QL: NEGATIVE
FET X + Y ANEUP RISK PLAS.CFDNA SEQ-IMP: NOT DETECTED
GA EST FROM CONCEPTION DATE: NORMAL D
GESTATIONAL AGE > 9:: YES
LAB DIRECTOR NAME PROVIDER: NORMAL
LAB DIRECTOR NAME PROVIDER: NORMAL
LABORATORY COMMENT REPORT: NORMAL
LIMITATIONS OF THE TEST: NORMAL
NEGATIVE PREDICTIVE VALUE: NORMAL
PERFORMANCE CHARACTERISTICS: NORMAL
POSITIVE PREDICTIVE VALUE: NORMAL
REF LAB TEST METHOD: NORMAL
SERVICE CMNT-IMP: NORMAL
TEST PERFORMANCE INFO SPEC: NORMAL

## 2025-03-10 ENCOUNTER — ROUTINE PRENATAL (OUTPATIENT)
Dept: OBSTETRICS AND GYNECOLOGY | Facility: CLINIC | Age: 25
End: 2025-03-10
Payer: COMMERCIAL

## 2025-03-10 VITALS — DIASTOLIC BLOOD PRESSURE: 60 MMHG | SYSTOLIC BLOOD PRESSURE: 120 MMHG | BODY MASS INDEX: 26.29 KG/M2 | WEIGHT: 148.4 LBS

## 2025-03-10 DIAGNOSIS — Z34.92 SECOND TRIMESTER PREGNANCY: Primary | ICD-10-CM

## 2025-03-10 RX ORDER — PNV NO.95/FERROUS FUM/FOLIC AC 28MG-0.8MG
1 TABLET ORAL DAILY
Qty: 90 TABLET | Refills: 12 | Status: SHIPPED | OUTPATIENT
Start: 2025-03-10

## 2025-03-10 NOTE — PROGRESS NOTES
Chief Complaint   Patient presents with    Routine Prenatal Visit     Prenatal visit with no problems or concerns        HPI:   , 16w1d gestation reports doing well    ROS:  See Prenatal Episode/Flowsheet  /60   Wt 67.3 kg (148 lb 6.4 oz)   LMP 2024   BMI 26.29 kg/m²      EXAM:  EXTREMITIES:  No swelling-See Prenatal Episode/Flowsheet    ABDOMEN:  FHTs/Movement noted-See Prenatal Episode/Flowsheet    URINE GLUCOSE/PROTEIN:  See Prenatal Episode/Flowsheet    PELVIC EXAM:  See Prenatal Episode/Flowsheet  CV:  Lungs:  GYN:    MDM:    Lab Results   Component Value Date    HGB 12.2 2025    RUBELLAABIGG <0.90 (L) 2025    HEPBSAG Negative 2025    ABO O 2025    RH Positive 2025    ABSCRN Negative 2025    [id number] Non Reactive 2025    HEPCVIRUSABY <0.1 2022    STREPGPB Negative 2023    URINECX 25,000 CFU/mL Normal Urogenital Trupti 2024       U/S:US Ob Transvaginal (2025 11:11)     1. IUP 16w1d  2. Routine care   3. Anatoimy 2-3 weeks

## 2025-03-24 ENCOUNTER — ROUTINE PRENATAL (OUTPATIENT)
Dept: OBSTETRICS AND GYNECOLOGY | Facility: CLINIC | Age: 25
End: 2025-03-24
Payer: COMMERCIAL

## 2025-03-24 VITALS — DIASTOLIC BLOOD PRESSURE: 60 MMHG | WEIGHT: 151.6 LBS | BODY MASS INDEX: 26.85 KG/M2 | SYSTOLIC BLOOD PRESSURE: 110 MMHG

## 2025-03-24 DIAGNOSIS — Z36.89 ENCOUNTER FOR FETAL ANATOMIC SURVEY: Primary | ICD-10-CM

## 2025-03-24 PROCEDURE — 99213 OFFICE O/P EST LOW 20 MIN: CPT | Performed by: OBSTETRICS & GYNECOLOGY

## 2025-03-24 NOTE — PROGRESS NOTES
Chief Complaint   Patient presents with    Routine Prenatal Visit     Prenatal visit with Anatomy Scan done today. No problems or concerns.        HPI:   , 18w1d gestation reports doing well    ROS:  See Prenatal Episode/Flowsheet  /60   Wt 68.8 kg (151 lb 9.6 oz)   LMP 2024   BMI 26.85 kg/m²      EXAM:  EXTREMITIES:  No swelling-See Prenatal Episode/Flowsheet    ABDOMEN:  FHTs/Movement noted-See Prenatal Episode/Flowsheet    URINE GLUCOSE/PROTEIN:  See Prenatal Episode/Flowsheet    PELVIC EXAM:  See Prenatal Episode/Flowsheet  CV:  Lungs:  GYN:    MDM:    Lab Results   Component Value Date    HGB 12.2 2025    RUBELLAABIGG <0.90 (L) 2025    HEPBSAG Negative 2025    ABO O 2025    RH Positive 2025    ABSCRN Negative 2025    [id number] Non Reactive 2025    HEPCVIRUSABY <0.1 2022    STREPGPB Negative 2023    URINECX 25,000 CFU/mL Normal Urogenital Trupti 2024       U/S: Anatomic survey is within normal limits.  Size is consistent with dates.  Active male fetus.  Vertex.  Anterior placenta with small posterior lobe.  Three-vessel cord.    1. IUP 18w1d  2. Routine care   3.  Normal anatomy

## 2025-03-26 LAB
AFP INTERP SERPL-IMP: NORMAL
AFP INTERP SERPL-IMP: NORMAL
AFP MOM SERPL: 1.15
AFP SERPL-MCNC: 50.4 NG/ML
AGE AT DELIVERY: 23.9 YR
GA METHOD: NORMAL
GA: 18.1 WEEKS
IDDM PATIENT QL: NO
LABORATORY COMMENT REPORT: NORMAL
MULTIPLE PREGNANCY: NO
NEURAL TUBE DEFECT RISK FETUS: 7603 %
RESULT: NORMAL

## 2025-04-21 ENCOUNTER — ROUTINE PRENATAL (OUTPATIENT)
Dept: OBSTETRICS AND GYNECOLOGY | Facility: CLINIC | Age: 25
End: 2025-04-21
Payer: COMMERCIAL

## 2025-04-21 ENCOUNTER — HOSPITAL ENCOUNTER (OUTPATIENT)
Facility: HOSPITAL | Age: 25
Discharge: HOME | End: 2025-04-21
Attending: MIDWIFE | Admitting: MIDWIFE
Payer: COMMERCIAL

## 2025-04-21 VITALS
TEMPERATURE: 98.7 F | HEIGHT: 63 IN | OXYGEN SATURATION: 100 % | SYSTOLIC BLOOD PRESSURE: 120 MMHG | DIASTOLIC BLOOD PRESSURE: 72 MMHG | BODY MASS INDEX: 28.07 KG/M2 | HEART RATE: 75 BPM | RESPIRATION RATE: 16 BRPM | WEIGHT: 158.4 LBS

## 2025-04-21 VITALS — DIASTOLIC BLOOD PRESSURE: 60 MMHG | SYSTOLIC BLOOD PRESSURE: 92 MMHG

## 2025-04-21 DIAGNOSIS — O26.899 ABDOMINAL PAIN AFFECTING PREGNANCY, ANTEPARTUM: Primary | ICD-10-CM

## 2025-04-21 DIAGNOSIS — R10.9 ABDOMINAL PAIN AFFECTING PREGNANCY, ANTEPARTUM: Primary | ICD-10-CM

## 2025-04-21 LAB
ALBUMIN SERPL-MCNC: 3.2 G/DL (ref 3.5–5.2)
ALBUMIN/GLOB SERPL: 1.1 G/DL
ALP SERPL-CCNC: 84 U/L (ref 39–117)
ALT SERPL W P-5'-P-CCNC: 8 U/L (ref 1–33)
ANION GAP SERPL CALCULATED.3IONS-SCNC: 12.8 MMOL/L (ref 5–15)
AST SERPL-CCNC: 15 U/L (ref 1–32)
BASOPHILS # BLD AUTO: 0.01 10*3/MM3 (ref 0–0.2)
BASOPHILS NFR BLD AUTO: 0.1 % (ref 0–1.5)
BILIRUB BLD-MCNC: NEGATIVE MG/DL
BILIRUB SERPL-MCNC: 0.2 MG/DL (ref 0–1.2)
BUN SERPL-MCNC: 5 MG/DL (ref 6–20)
BUN/CREAT SERPL: 13.9 (ref 7–25)
CALCIUM SPEC-SCNC: 8.2 MG/DL (ref 8.6–10.5)
CHLORIDE SERPL-SCNC: 99 MMOL/L (ref 98–107)
CLARITY, POC: ABNORMAL
CO2 SERPL-SCNC: 20.2 MMOL/L (ref 22–29)
COLOR UR: ABNORMAL
CREAT SERPL-MCNC: 0.36 MG/DL (ref 0.57–1)
DEPRECATED RDW RBC AUTO: 40.6 FL (ref 37–54)
EGFRCR SERPLBLD CKD-EPI 2021: 146.5 ML/MIN/1.73
EOSINOPHIL # BLD AUTO: 0.01 10*3/MM3 (ref 0–0.4)
EOSINOPHIL NFR BLD AUTO: 0.1 % (ref 0.3–6.2)
ERYTHROCYTE [DISTWIDTH] IN BLOOD BY AUTOMATED COUNT: 13.2 % (ref 12.3–15.4)
FIBRINOGEN PPP-MCNC: 431 MG/DL (ref 209–518)
GLOBULIN UR ELPH-MCNC: 2.8 GM/DL
GLUCOSE SERPL-MCNC: 118 MG/DL (ref 65–99)
GLUCOSE UR STRIP-MCNC: NEGATIVE MG/DL
HCT VFR BLD AUTO: 32.8 % (ref 34–46.6)
HGB BLD-MCNC: 11 G/DL (ref 12–15.9)
IMM GRANULOCYTES # BLD AUTO: 0.09 10*3/MM3 (ref 0–0.05)
IMM GRANULOCYTES NFR BLD AUTO: 0.8 % (ref 0–0.5)
KETONES UR QL: NEGATIVE
LEUKOCYTE EST, POC: NEGATIVE
LYMPHOCYTES # BLD AUTO: 1.3 10*3/MM3 (ref 0.7–3.1)
LYMPHOCYTES NFR BLD AUTO: 11.1 % (ref 19.6–45.3)
MCH RBC QN AUTO: 28.4 PG (ref 26.6–33)
MCHC RBC AUTO-ENTMCNC: 33.5 G/DL (ref 31.5–35.7)
MCV RBC AUTO: 84.8 FL (ref 79–97)
MONOCYTES # BLD AUTO: 0.3 10*3/MM3 (ref 0.1–0.9)
MONOCYTES NFR BLD AUTO: 2.6 % (ref 5–12)
NEUTROPHILS NFR BLD AUTO: 85.3 % (ref 42.7–76)
NEUTROPHILS NFR BLD AUTO: 9.95 10*3/MM3 (ref 1.7–7)
NITRITE UR-MCNC: NEGATIVE MG/ML
NRBC BLD AUTO-RTO: 0 /100 WBC (ref 0–0.2)
PH UR: 6 [PH] (ref 5–8)
PLATELET # BLD AUTO: 245 10*3/MM3 (ref 140–450)
PMV BLD AUTO: 11.2 FL (ref 6–12)
POTASSIUM SERPL-SCNC: 3.6 MMOL/L (ref 3.5–5.2)
PROT SERPL-MCNC: 6 G/DL (ref 6–8.5)
PROT UR STRIP-MCNC: NEGATIVE MG/DL
RBC # BLD AUTO: 3.87 10*6/MM3 (ref 3.77–5.28)
RBC # UR STRIP: NEGATIVE /UL
SODIUM SERPL-SCNC: 132 MMOL/L (ref 136–145)
SP GR UR: 1.03 (ref 1–1.03)
UROBILINOGEN UR QL: NORMAL
WBC NRBC COR # BLD AUTO: 11.66 10*3/MM3 (ref 3.4–10.8)

## 2025-04-21 PROCEDURE — 36415 COLL VENOUS BLD VENIPUNCTURE: CPT | Performed by: OBSTETRICS & GYNECOLOGY

## 2025-04-21 PROCEDURE — G0463 HOSPITAL OUTPT CLINIC VISIT: HCPCS

## 2025-04-21 PROCEDURE — 85384 FIBRINOGEN ACTIVITY: CPT | Performed by: OBSTETRICS & GYNECOLOGY

## 2025-04-21 PROCEDURE — 85025 COMPLETE CBC W/AUTO DIFF WBC: CPT | Performed by: OBSTETRICS & GYNECOLOGY

## 2025-04-21 PROCEDURE — 81002 URINALYSIS NONAUTO W/O SCOPE: CPT | Performed by: MIDWIFE

## 2025-04-21 PROCEDURE — 25810000003 LACTATED RINGERS SOLUTION: Performed by: MIDWIFE

## 2025-04-21 PROCEDURE — 25010000002 ONDANSETRON PER 1 MG: Performed by: MIDWIFE

## 2025-04-21 PROCEDURE — 96374 THER/PROPH/DIAG INJ IV PUSH: CPT

## 2025-04-21 PROCEDURE — 25810000003 LACTATED RINGERS PER 1000 ML: Performed by: MIDWIFE

## 2025-04-21 PROCEDURE — 80053 COMPREHEN METABOLIC PANEL: CPT | Performed by: OBSTETRICS & GYNECOLOGY

## 2025-04-21 RX ORDER — CYCLOBENZAPRINE HCL 10 MG
10 TABLET ORAL EVERY 8 HOURS PRN
Qty: 30 TABLET | Refills: 1 | Status: SHIPPED | OUTPATIENT
Start: 2025-04-21 | End: 2026-04-21

## 2025-04-21 RX ORDER — SODIUM CHLORIDE 9 MG/ML
40 INJECTION, SOLUTION INTRAVENOUS AS NEEDED
Status: DISCONTINUED | OUTPATIENT
Start: 2025-04-21 | End: 2025-04-21 | Stop reason: HOSPADM

## 2025-04-21 RX ORDER — ONDANSETRON 2 MG/ML
4 INJECTION INTRAMUSCULAR; INTRAVENOUS ONCE
Status: COMPLETED | OUTPATIENT
Start: 2025-04-21 | End: 2025-04-21

## 2025-04-21 RX ORDER — LIDOCAINE HYDROCHLORIDE 10 MG/ML
0.5 INJECTION, SOLUTION INFILTRATION; PERINEURAL ONCE AS NEEDED
Status: DISCONTINUED | OUTPATIENT
Start: 2025-04-21 | End: 2025-04-21 | Stop reason: HOSPADM

## 2025-04-21 RX ORDER — SODIUM CHLORIDE, SODIUM LACTATE, POTASSIUM CHLORIDE, CALCIUM CHLORIDE 600; 310; 30; 20 MG/100ML; MG/100ML; MG/100ML; MG/100ML
125 INJECTION, SOLUTION INTRAVENOUS CONTINUOUS
Status: DISCONTINUED | OUTPATIENT
Start: 2025-04-21 | End: 2025-04-21 | Stop reason: HOSPADM

## 2025-04-21 RX ORDER — DIPHENHYDRAMINE HYDROCHLORIDE 25 MG/1
CAPSULE ORAL
COMMUNITY
Start: 2025-04-14

## 2025-04-21 RX ORDER — SODIUM CHLORIDE 0.9 % (FLUSH) 0.9 %
10 SYRINGE (ML) INJECTION EVERY 12 HOURS SCHEDULED
Status: DISCONTINUED | OUTPATIENT
Start: 2025-04-21 | End: 2025-04-21 | Stop reason: HOSPADM

## 2025-04-21 RX ORDER — ACETAMINOPHEN 500 MG
1000 TABLET ORAL ONCE
Status: COMPLETED | OUTPATIENT
Start: 2025-04-21 | End: 2025-04-21

## 2025-04-21 RX ORDER — SODIUM CHLORIDE 0.9 % (FLUSH) 0.9 %
10 SYRINGE (ML) INJECTION AS NEEDED
Status: DISCONTINUED | OUTPATIENT
Start: 2025-04-21 | End: 2025-04-21 | Stop reason: HOSPADM

## 2025-04-21 RX ORDER — CYCLOBENZAPRINE HCL 10 MG
10 TABLET ORAL ONCE
Status: COMPLETED | OUTPATIENT
Start: 2025-04-21 | End: 2025-04-21

## 2025-04-21 RX ADMIN — SODIUM CHLORIDE, POTASSIUM CHLORIDE, SODIUM LACTATE AND CALCIUM CHLORIDE 125 ML/HR: 600; 310; 30; 20 INJECTION, SOLUTION INTRAVENOUS at 06:13

## 2025-04-21 RX ADMIN — SODIUM CHLORIDE, POTASSIUM CHLORIDE, SODIUM LACTATE AND CALCIUM CHLORIDE 1000 ML: 600; 310; 30; 20 INJECTION, SOLUTION INTRAVENOUS at 01:55

## 2025-04-21 RX ADMIN — ONDANSETRON 4 MG: 2 INJECTION INTRAMUSCULAR; INTRAVENOUS at 02:26

## 2025-04-21 RX ADMIN — CYCLOBENZAPRINE 10 MG: 10 TABLET, FILM COATED ORAL at 07:36

## 2025-04-21 RX ADMIN — ACETAMINOPHEN 1000 MG: 500 TABLET ORAL at 07:36

## 2025-04-21 NOTE — NURSING NOTE
This RN spoke with Beatris OCASIO via telephone regarding patient admission to [facility]; Telephone order received to check patient cervix and start IV and bolus 1000 ML LR if cervix dilated; R/B/V.

## 2025-04-21 NOTE — NURSING NOTE
Dr. Poon @ patient bedside. POC discussed with patient. Verbal orders received to draw CBC with diff, CMP, Fibrinogen, give patient Flexeril 10 mg PO x1 and Tylenol 1000 MG PO x1; R/B/V.

## 2025-04-21 NOTE — PROGRESS NOTES
Chief Complaint   Patient presents with    Routine Prenatal Visit     Prenatal visit with [facility] [place] follow up. Cervical length done today.        HPI:   , 22w1d gestation reports doing well    ROS:  See Prenatal Episode/Flowsheet  BP 92/60   LMP 2024      EXAM:  EXTREMITIES:  No swelling-See Prenatal Episode/Flowsheet    ABDOMEN:  FHTs/Movement noted-See Prenatal Episode/Flowsheet    URINE GLUCOSE/PROTEIN:  See Prenatal Episode/Flowsheet    PELVIC EXAM:  See Prenatal Episode/Flowsheet  CV:  Lungs:  GYN:    MDM:    Lab Results   Component Value Date    HGB 11.0 (L) 2025    RUBELLAABIGG <0.90 (L) 2025    HEPBSAG Negative 2025    ABO O 2025    RH Positive 2025    ABSCRN Negative 2025    [id number] Non Reactive 2025    HEPCVIRUSABY <0.1 2022    STREPGPB Negative 2023    URINECX 25,000 CFU/mL Normal Urogenital Trupti 2024       U/S: cervical length 3.5cm, no funnel    1. IUP 22w1d  2. Routine care   3. Lower back pain: Patient was observed overnight on L&D.  Given IV fluids 3 L as well as Flexeril.  Flexeril helped her wrist a little bit as she got really no sleep overnight.  No vaginal bleeding fetal status was reassuring for gestational age.  CBC CMP fibrinogen normal.

## 2025-04-21 NOTE — NON STRESS TEST
Triage Note - Nursing Documentation  Labor and Delivery Admission Log    Meg Mccrary  : 2000  MRN: [id number]  CSN: [id number]    Date in / Time in:  2025  Time in: 7    Date out / Time out:    Time out: 1117    Nurse: Denias Triana RN    Patient Info: She is a 24 y.o. year old  at 22w1d with an LEIDY of 2025, by Ultrasound who was seen on the [facility] [place].    Chief Complaint:   Chief Complaint   Patient presents with    Abdominal Cramping     Patient verbalizes she started having lower abd cramping around 1600 yesterday that has became worse.       Provider Instructions / Disposition: VSS. Pain controlled with rx meds. Pt educated on  labor, kick counts, round ligament pain and armani coreas contractions. Pt advised to return to [facility] [place] if any changes in conditions. Pt verbally agreed and signed. Pt stable to d/c per MD order.     Patient Active Problem List   Diagnosis     (spontaneous vaginal delivery)       NST Documentation (Only applicable > 32 weeks): Interpretation A  Comments A: Pt does not meet criteria for NST due to gestational age. (25 1132 : Denisa Triana, YANELIS)

## 2025-04-21 NOTE — NURSING NOTE
Pt transferred to [facility] office via wheel chair per Dr. Poon's orders for office visit. Office staff to notify this nurse when pt is to return to [facility]. VSS, pt c/o pain 1/10. Pt stable to transfer.

## 2025-04-21 NOTE — NURSING NOTE
This RN spoke with Beatris OCASIO regarding vaginal exam; telephone order received to go ahead and start IV and bolus 1000 ml LR; R/B/V.

## 2025-04-22 ENCOUNTER — HOSPITAL ENCOUNTER (OUTPATIENT)
Facility: HOSPITAL | Age: 25
Discharge: HOME | End: 2025-04-22
Attending: MIDWIFE | Admitting: MIDWIFE
Payer: COMMERCIAL

## 2025-04-22 VITALS
OXYGEN SATURATION: 99 % | DIASTOLIC BLOOD PRESSURE: 63 MMHG | BODY MASS INDEX: 29.08 KG/M2 | WEIGHT: 158 LBS | RESPIRATION RATE: 16 BRPM | SYSTOLIC BLOOD PRESSURE: 118 MMHG | HEART RATE: 94 BPM | HEIGHT: 62 IN

## 2025-04-22 PROBLEM — Z34.90 PREGNANCY: Status: ACTIVE | Noted: 2025-04-22

## 2025-04-22 LAB
BILIRUB BLD-MCNC: NEGATIVE MG/DL
CLARITY, POC: CLEAR
COLOR UR: YELLOW
GLUCOSE UR STRIP-MCNC: NEGATIVE MG/DL
KETONES UR QL: NEGATIVE
LEUKOCYTE EST, POC: ABNORMAL
NITRITE UR-MCNC: NEGATIVE MG/ML
PH UR: 6 [PH] (ref 5–8)
PROT UR STRIP-MCNC: ABNORMAL MG/DL
RBC # UR STRIP: NEGATIVE /UL
SP GR UR: 1.01 (ref 1–1.03)
UROBILINOGEN UR QL: ABNORMAL

## 2025-04-22 PROCEDURE — 81002 URINALYSIS NONAUTO W/O SCOPE: CPT | Performed by: MIDWIFE

## 2025-04-22 PROCEDURE — G0463 HOSPITAL OUTPT CLINIC VISIT: HCPCS

## 2025-04-22 RX ORDER — DOCUSATE SODIUM 100 MG/1
100 CAPSULE, LIQUID FILLED ORAL 2 TIMES DAILY PRN
Qty: 60 CAPSULE | Refills: 2 | Status: SHIPPED | OUTPATIENT
Start: 2025-04-22

## 2025-04-22 RX ORDER — POLYETHYLENE GLYCOL 3350 17 G/17G
17 POWDER, FOR SOLUTION ORAL ONCE
Status: COMPLETED | OUTPATIENT
Start: 2025-04-22 | End: 2025-04-22

## 2025-04-22 RX ORDER — ACETAMINOPHEN 500 MG
1000 TABLET ORAL ONCE
Status: DISCONTINUED | OUTPATIENT
Start: 2025-04-22 | End: 2025-04-22 | Stop reason: HOSPADM

## 2025-04-22 RX ADMIN — POLYETHYLENE GLYCOL 3350 17 G: 17 POWDER, FOR SOLUTION ORAL at 16:45

## 2025-04-22 NOTE — NON STRESS TEST
Triage Note - Nursing Documentation  Labor and Delivery Admission Log    Meg Mccrary  : 2000  MRN: [id number]  CSN: [id number]    Date in / Time in:  2025  Time in: 1432    Date out / Time out:    Time out: 1717    Nurse: Maris Tang RN    Patient Info: She is a 24 y.o. year old  at 22w2d with an LEIDY of 2025, by Ultrasound who was seen on the [facility] [place].    Chief Complaint:   Chief Complaint   Patient presents with    Abdominal Pain     Pt states she is experiencing bilateral side pain  7/10 pain        Provider Instructions / Disposition:     Patient educated on PO hydration, fetal movement, constipation and  labor. Patient verbalized understanding to instructions and signed. Patient to follow up with next scheduled appointment in OB/GYN. Patient instructed if any change in condition; patient to return to [facility] [place] for assessment/evaluation. Patient discharged home in stable condition. - Maris Tang RN.    Patient Active Problem List   Diagnosis     (spontaneous vaginal delivery)    Pregnancy       NST Documentation (Only applicable > 32 weeks):

## 2025-04-22 NOTE — H&P
: 2000  MRN: [id number]  CSN: [id number]    History and Physical    Chief Complaint   Patient presents with    Abdominal Pain     Pt states she is experiencing bilateral side pain  7/10 pain       Teresa Mccrary is a 24 y.o. year old  with an Estimated Date of Delivery: 25 currently at 22w2d presenting with  abdominal pain .  She states the front of her abdomen on both sides are hurting and making it difficult to get out of bed. Her symptoms started yesterday and she was seen on [facility]. She denies any ctxs, vaginal bleeding, N/V, or diarrhea.  She has been constipated. She was seen on [facility] yesterday and given Tylenol and Flexeril. She took those this morning. She states the Flexeril just makes her sleepy. She had TVS in office yesterday with CL>3.5 cm.     She has received prenatal care with [facility].       OB History    Para Term  AB Living   5 3 3 0 1 3   SAB IAB Ectopic Molar Multiple Live Births   1 0 0 0 0 3      # Outcome Date GA Lbr Chalo/2nd Weight Sex Type Anes PTL Lv   5 Current            4 Term 24 40w2d 07:15 / 00:10 3657 g (8 lb 1 oz) M Vag-Spont None N ROSA      Name: TERESA BAINRADHA      Apgar1: 9  Apgar5: 9   3 SAB 22           2 Term 21 38w4d / 00:01 3147 g (6 lb 15 oz) M Vag-Spont None N ROSA      Name: TERESA BAINRADHA      Apgar1: 8  Apgar5: 9   1 Term 18 39w2d 04:58 / 00:38 3147 g (6 lb 15 oz) F Vag-Spont None N ROSA      Name: TERESA BAINBECCA      Apgar1: 8  Apgar5: 9     Past Medical History:   Diagnosis Date    Anemia Mild anima after bith    Deep vein thrombosis After second birth had abnormal blood clots    Encounter for insertion of ParaGard IUD 2023    Multiple gestation 3vaginal    Rubella non-immune status, antepartum 2020    Urinary tract infection      History reviewed. No pertinent surgical history.    Current Facility-Administered Medications:     acetaminophen (TYLENOL) tablet  "1,000 mg, 1,000 mg, Oral, Once, America Michelle CNM    polyethylene glycol (MIRALAX) packet 17 g, 17 g, Oral, Once, America Michelle CNM    Allergies   Allergen Reactions    Fish-Derived Products Rash       Review of Systems     Respiratory ROS: no cough, shortness of breath, or wheezing  Cardiovascular ROS: no chest pain or dyspnea on exertion  Gastrointestinal ROS: positive for - abdominal pain and constipation  Genito-Urinary ROS: no dysuria, trouble voiding, or hematuria        Objective   /61 (BP Location: Right arm, Patient Position: Lying)   Pulse 88   Resp 16   Ht 157.5 cm (62\")   Wt 71.7 kg (158 lb)   LMP 11/20/2024   SpO2 95%   BMI 28.90 kg/m²     Physical Exam: General Appearance: alert, appears stated age, and cooperative  Lungs: respirations regular, respirations even, and respirations unlabored  Abdomen: uterus gravid and nontender  Extremities: moves extremities well, no edema, no cyanosis, and no redness  Skin: no bleeding, bruising or rash and no lesions noted  Neurologic: Mental Status orientated to person, place, time and situation, Speech normal content and execusion       FHT's: reassuring and category 1      Cervix: was not checked.   Presentation: cephalic   Contractions: none - external monitors used         Prenatal Labs  Lab Results   Component Value Date    HGB 11.0 (L) 04/21/2025    HEPBSAG Negative 01/13/2025    ABSCRN Negative 01/13/2025    [id number] Non Reactive 01/13/2025    HEPCVIRUSABY <0.1 08/23/2022    STREPGPB Negative 01/16/2023    URINECX 25,000 CFU/mL Normal Urogenital Trupti 12/29/2024       Current Labs Reviewed   Lab Results (last 24 hours)       Procedure Component Value Units Date/Time    POC Urinalysis Dipstick [[id number]]  (Abnormal) Collected: 04/22/25 1458    Specimen: Urine, Clean Catch Updated: 04/22/25 1459     Color Yellow     Clarity, UA Clear     Glucose, UA Negative mg/dL      Bilirubin Negative     Ketones, UA Negative     Specific Gravity  " 1.015     Blood, UA Negative     pH, Urine 6.0     Protein, POC Trace mg/dL      Urobilinogen, UA 0.2 E.U./dL     Leukocytes Trace     Nitrite, UA Negative                 Assessment   IUP at 22w2d  Abdominal pain  Round ligament pain  Constipation           Plan   PO hydration  Miralax  po  EFM  4.  Advised maternity support garment /belt  5.  Rx for Colace BID PRN, increase water intake  6.  Keep appt as scheduled    New Medications Ordered This Visit   Medications    acetaminophen (TYLENOL) tablet 1,000 mg    polyethylene glycol (MIRALAX) packet 17 g    docusate sodium (Colace) 100 MG capsule     Sig: Take 1 capsule by mouth 2 (Two) Times a Day As Needed for Constipation.     Dispense:  60 capsule     Refill:  2       America Michelle CNM  4/22/2025  15:53 EDT

## 2025-04-28 ENCOUNTER — ROUTINE PRENATAL (OUTPATIENT)
Dept: OBSTETRICS AND GYNECOLOGY | Facility: CLINIC | Age: 25
End: 2025-04-28
Payer: COMMERCIAL

## 2025-04-28 VITALS — SYSTOLIC BLOOD PRESSURE: 100 MMHG | BODY MASS INDEX: 28.53 KG/M2 | DIASTOLIC BLOOD PRESSURE: 60 MMHG | WEIGHT: 156 LBS

## 2025-04-28 DIAGNOSIS — Z34.92 SECOND TRIMESTER PREGNANCY: Primary | ICD-10-CM

## 2025-04-28 NOTE — PROGRESS NOTES
Chief Complaint   Patient presents with    Routine Prenatal Visit     Prenatal visit , follow up from [facility] [place] visit.        HPI:   , 23w1d gestation reports doing well    ROS:  See Prenatal Episode/Flowsheet  /60   Wt 70.8 kg (156 lb)   LMP 2024   BMI 28.53 kg/m²      EXAM:  EXTREMITIES:  No swelling-See Prenatal Episode/Flowsheet    ABDOMEN:  FHTs/Movement noted-See Prenatal Episode/Flowsheet    URINE GLUCOSE/PROTEIN:  See Prenatal Episode/Flowsheet    PELVIC EXAM:  See Prenatal Episode/Flowsheet  CV:  Lungs:  GYN:    MDM:    Lab Results   Component Value Date    HGB 11.0 (L) 2025    RUBELLAABIGG <0.90 (L) 2025    HEPBSAG Negative 2025    ABO O 2025    RH Positive 2025    ABSCRN Negative 2025    [id number] Non Reactive 2025    HEPCVIRUSABY <0.1 2022    STREPGPB Negative 2023    URINECX 25,000 CFU/mL Normal Urogenital Trupti 2024       U/S:US Ob 14 + Weeks Single or First Gestation (2025 10:45)     1. IUP 23w1d  2. Routine care   3. Glucola 2-3 weeks

## 2025-05-12 ENCOUNTER — ROUTINE PRENATAL (OUTPATIENT)
Dept: OBSTETRICS AND GYNECOLOGY | Facility: CLINIC | Age: 25
End: 2025-05-12
Payer: COMMERCIAL

## 2025-05-12 VITALS — BODY MASS INDEX: 29.45 KG/M2 | WEIGHT: 161 LBS | DIASTOLIC BLOOD PRESSURE: 62 MMHG | SYSTOLIC BLOOD PRESSURE: 112 MMHG

## 2025-05-12 DIAGNOSIS — O26.899 ABDOMINAL PAIN AFFECTING PREGNANCY, ANTEPARTUM: ICD-10-CM

## 2025-05-12 DIAGNOSIS — R10.9 ABDOMINAL PAIN AFFECTING PREGNANCY, ANTEPARTUM: ICD-10-CM

## 2025-05-12 DIAGNOSIS — K21.9 GASTROESOPHAGEAL REFLUX DISEASE WITHOUT ESOPHAGITIS: ICD-10-CM

## 2025-05-12 DIAGNOSIS — Z30.2 REQUEST FOR STERILIZATION: ICD-10-CM

## 2025-05-12 DIAGNOSIS — Z34.82 ENCOUNTER FOR SUPERVISION OF OTHER NORMAL PREGNANCY, SECOND TRIMESTER: Primary | ICD-10-CM

## 2025-05-12 LAB
BASOPHILS # BLD AUTO: 0.02 10*3/MM3 (ref 0–0.2)
BASOPHILS NFR BLD AUTO: 0.3 % (ref 0–1.5)
EOSINOPHIL # BLD AUTO: 0.11 10*3/MM3 (ref 0–0.4)
EOSINOPHIL NFR BLD AUTO: 1.6 % (ref 0.3–6.2)
ERYTHROCYTE [DISTWIDTH] IN BLOOD BY AUTOMATED COUNT: 13.2 % (ref 12.3–15.4)
GLUCOSE 1H P 50 G GLC PO SERPL-MCNC: 103 MG/DL (ref 65–139)
HCT VFR BLD AUTO: 33.4 % (ref 34–46.6)
HGB BLD-MCNC: 10.9 G/DL (ref 12–15.9)
IMM GRANULOCYTES # BLD AUTO: 0.1 10*3/MM3 (ref 0–0.05)
IMM GRANULOCYTES NFR BLD AUTO: 1.5 % (ref 0–0.5)
LYMPHOCYTES # BLD AUTO: 2.11 10*3/MM3 (ref 0.7–3.1)
LYMPHOCYTES NFR BLD AUTO: 31.3 % (ref 19.6–45.3)
MCH RBC QN AUTO: 28.2 PG (ref 26.6–33)
MCHC RBC AUTO-ENTMCNC: 32.6 G/DL (ref 31.5–35.7)
MCV RBC AUTO: 86.5 FL (ref 79–97)
MONOCYTES # BLD AUTO: 0.61 10*3/MM3 (ref 0.1–0.9)
MONOCYTES NFR BLD AUTO: 9.1 % (ref 5–12)
NEUTROPHILS # BLD AUTO: 3.79 10*3/MM3 (ref 1.7–7)
NEUTROPHILS NFR BLD AUTO: 56.2 % (ref 42.7–76)
NRBC BLD AUTO-RTO: 0 /100 WBC (ref 0–0.2)
PLATELET # BLD AUTO: 352 10*3/MM3 (ref 140–450)
RBC # BLD AUTO: 3.86 10*6/MM3 (ref 3.77–5.28)
WBC # BLD AUTO: 6.74 10*3/MM3 (ref 3.4–10.8)

## 2025-05-12 RX ORDER — FAMOTIDINE 20 MG/1
20 TABLET, FILM COATED ORAL 2 TIMES DAILY
Qty: 60 TABLET | Refills: 5 | Status: SHIPPED | OUTPATIENT
Start: 2025-05-12

## 2025-05-12 NOTE — PROGRESS NOTES
Chief Complaint  Routine Prenatal Visit (Glucola done today, no complaints. )    History of Present Illness:  Meg is a  currently at 25w1d who presents today with complaints of reflux.  Patient has not been on any medication.  She denies any nausea or emesis.  She did have previous episodes of severe abdominal pain.  She reports her pain has improved.  She does have occasional cramping and contractions but not severe in nature.  She has been taking her prenatal vitamins.    Exam:  Vitals:  See prenatal flowsheet as noted and reviewed  General: Alert, cooperative, and does not appear in any distress  Abdomen:   See prenatal flowsheet as noted and reviewed    Uterus gravid, non-tender; no palpable masses    No guarding or rebound tenderness  Pelvic:  See prenatal flowsheet as noted and reviewed  Ext:  See prenatal flowsheet as noted and reviewed    Moves extremities well, no cyanosis and no redness  Urine:  See prenatal flowsheet as noted and reviewed    Data Review:  The following data was reviewed by: Divya Mena MD on 2025:  Prenatal Labs:  Lab Results   Component Value Date    HGB 11.0 (L) 2025    RUBELLAABIGG <0.90 (L) 2025    HEPBSAG Negative 2025    ABO O 2025    RH Positive 2025    ABSCRN Negative 2025    [id number] Non Reactive 2025    HEPCVIRUSABY <0.1 2022    STREPGPB Negative 2023    URINECX 25,000 CFU/mL Normal Urogenital Trupti 2024       Admission on 2025, Discharged on 2025   Component Date Value    Color 2025 Yellow     Clarity, UA 2025 Clear     Glucose, UA 2025 Negative     Bilirubin 2025 Negative     Ketones, UA 2025 Negative     Specific Gravity  2025 1.015     Blood, UA 2025 Negative     pH, Urine 2025 6.0     Protein, POC 2025 Trace (A)     Urobilinogen, UA 2025 0.2 E.U./dL     Leukocytes 2025 Trace (A)     Nitrite, UA 2025 Negative     Admission on 04/21/2025, Discharged on 04/21/2025   Component Date Value    Color 04/21/2025 Straw     Clarity, UA 04/21/2025 Slightly Cloudy (A)     Glucose, UA 04/21/2025 Negative     Bilirubin 04/21/2025 Negative     Ketones, UA 04/21/2025 Negative     Specific Gravity  04/21/2025 1.030     Blood, UA 04/21/2025 Negative     pH, Urine 04/21/2025 6.0     Protein, POC 04/21/2025 Negative     Urobilinogen, UA 04/21/2025 Normal     Leukocytes 04/21/2025 Negative     Nitrite, UA 04/21/2025 Negative     Glucose 04/21/2025 118 (H)     BUN 04/21/2025 5 (L)     Creatinine 04/21/2025 0.36 (L)     Sodium 04/21/2025 132 (L)     Potassium 04/21/2025 3.6     Chloride 04/21/2025 99     CO2 04/21/2025 20.2 (L)     Calcium 04/21/2025 8.2 (L)     Total Protein 04/21/2025 6.0     Albumin 04/21/2025 3.2 (L)     ALT (SGPT) 04/21/2025 8     AST (SGOT) 04/21/2025 15     Alkaline Phosphatase 04/21/2025 84     Total Bilirubin 04/21/2025 0.2     Globulin 04/21/2025 2.8     A/G Ratio 04/21/2025 1.1     BUN/Creatinine Ratio 04/21/2025 13.9     Anion Gap 04/21/2025 12.8     eGFR 04/21/2025 146.5     Fibrinogen 04/21/2025 431     WBC 04/21/2025 11.66 (H)     RBC 04/21/2025 3.87     Hemoglobin 04/21/2025 11.0 (L)     Hematocrit 04/21/2025 32.8 (L)     MCV 04/21/2025 84.8     MCH 04/21/2025 28.4     MCHC 04/21/2025 33.5     RDW 04/21/2025 13.2     RDW-SD 04/21/2025 40.6     MPV 04/21/2025 11.2     Platelets 04/21/2025 245     Neutrophil % 04/21/2025 85.3 (H)     Lymphocyte % 04/21/2025 11.1 (L)     Monocyte % 04/21/2025 2.6 (L)     Eosinophil % 04/21/2025 0.1 (L)     Basophil % 04/21/2025 0.1     Immature Grans % 04/21/2025 0.8 (H)     Neutrophils, Absolute 04/21/2025 9.95 (H)     Lymphocytes, Absolute 04/21/2025 1.30     Monocytes, Absolute 04/21/2025 0.30     Eosinophils, Absolute 04/21/2025 0.01     Basophils, Absolute 04/21/2025 0.01     Immature Grans, Absolute 04/21/2025 0.09 (H)     nRBC 04/21/2025 0.0      Imaging:  US Ob  Transvaginal  cervical length 3.5cm, no funnel     Medical Records:  None    Assessment and Plan:  Problem List Items Addressed This Visit    None  Visit Diagnoses         Encounter for supervision of other normal pregnancy, second trimester    -  Primary  Topics discussed:     GERD management  iron supplementation  kick counts and fetal movement  PIH precautions   labor signs and symptoms  tubal risks, benefits  Labs today as noted    Relevant Orders    Gestational Screen 1 Hr ([facility])    CBC & Differential      Abdominal pain affecting pregnancy, antepartum      Instructions and precautions have been given.  Patient to call if worsening and/or changes in her symptoms.      Request for sterilization      Patient to sign tubal papers at 30 weeks.      Gastroesophageal reflux disease without esophagitis      Prescription given for Pepcid.  Patient to call if worsening and/or changes in her symptoms.    Relevant Medications    famotidine (PEPCID) 20 MG tablet          Follow Up/Instructions:  Follow up as scheduled.  Patient was given instructions and counseling regarding her condition or for health maintenance advice. Please see specific information pulled into the AVS if appropriate.     Note: Speech recognition transcription software may have been used to dictate portions of this document.  An attempt at proofreading has been made though minor errors in transcription may still be present.    This note was electronically signed.  Divya Mena M.D.

## 2025-06-18 ENCOUNTER — ROUTINE PRENATAL (OUTPATIENT)
Dept: OBSTETRICS AND GYNECOLOGY | Facility: CLINIC | Age: 25
End: 2025-06-18
Payer: COMMERCIAL

## 2025-06-18 VITALS — WEIGHT: 172.8 LBS | BODY MASS INDEX: 31.61 KG/M2 | DIASTOLIC BLOOD PRESSURE: 60 MMHG | SYSTOLIC BLOOD PRESSURE: 100 MMHG

## 2025-06-18 DIAGNOSIS — Z34.93 THIRD TRIMESTER PREGNANCY: Primary | ICD-10-CM

## 2025-06-18 NOTE — PROGRESS NOTES
Chief Complaint   Patient presents with    Routine Prenatal Visit     Prenatal visit with no problems or concerns        HPI:   , 30w3d gestation reports doing well    ROS:  See Prenatal Episode/Flowsheet  /60   Wt 78.4 kg (172 lb 12.8 oz)   LMP 2024   BMI 31.61 kg/m²      EXAM:  EXTREMITIES:  No swelling-See Prenatal Episode/Flowsheet    ABDOMEN:  FHTs/Movement noted-See Prenatal Episode/Flowsheet    URINE GLUCOSE/PROTEIN:  See Prenatal Episode/Flowsheet    PELVIC EXAM:  See Prenatal Episode/Flowsheet  CV:  Lungs:  GYN:    MDM:    Lab Results   Component Value Date    HGB 10.9 (L) 2025    RUBELLAABIGG <0.90 (L) 2025    HEPBSAG Negative 2025    ABO O 2025    RH Positive 2025    ABSCRN Negative 2025    [id number] Non Reactive 2025    HEPCVIRUSABY <0.1 2022    STREPGPB Negative 2023    URINECX 25,000 CFU/mL Normal Urogenital Trupti 2024       U/S:US Ob 14 + Weeks Single or First Gestation (2025 10:45)     1. IUP 30w3d  2. Routine care   3. Anemia: taking Fe and PNV

## 2025-06-30 ENCOUNTER — ROUTINE PRENATAL (OUTPATIENT)
Dept: OBSTETRICS AND GYNECOLOGY | Facility: CLINIC | Age: 25
End: 2025-06-30
Payer: COMMERCIAL

## 2025-06-30 VITALS — BODY MASS INDEX: 32.12 KG/M2 | DIASTOLIC BLOOD PRESSURE: 66 MMHG | SYSTOLIC BLOOD PRESSURE: 104 MMHG | WEIGHT: 175.6 LBS

## 2025-06-30 DIAGNOSIS — Z34.83 PRENATAL CARE, SUBSEQUENT PREGNANCY IN THIRD TRIMESTER: Primary | ICD-10-CM

## 2025-06-30 PROCEDURE — 99213 OFFICE O/P EST LOW 20 MIN: CPT | Performed by: OBSTETRICS & GYNECOLOGY

## 2025-06-30 NOTE — PROGRESS NOTES
Prenatal Care Visit    Subjective   Chief Complaint   Patient presents with    Routine Prenatal Visit     Growth done today. No issues/concerns       History:   Meg is a  currently at 32w1d who presents for a prenatal care visit today.    No major issues.    Social History    Tobacco Use      Smoking status: Never      Smokeless tobacco: Never       Objective   /66   Wt 79.7 kg (175 lb 9.6 oz)   LMP 2024   BMI 32.12 kg/m²   Physical Exam:  Normal, gestational age-appropriate exam today        Plan   Medical Decision Making:    I have reviewed the prenatal labs and ultrasound(s) today. I have reviewed the most recent prenatal progress note(s).    Diagnosis: Supervision of low risk pregnancy   Tests/Orders/Rx today: No orders of the defined types were placed in this encounter.      Medication Management: None     Topics discussed: Prenatal care milestones  kick counts and fetal movement  PIH precautions   labor signs and symptoms  U/S findings   Tests next visit: none   Next visit: 2 week(s)     Nba Call MD  Obstetrics and Gynecology  [facility]

## 2025-07-14 ENCOUNTER — ROUTINE PRENATAL (OUTPATIENT)
Dept: OBSTETRICS AND GYNECOLOGY | Facility: CLINIC | Age: 25
End: 2025-07-14
Payer: COMMERCIAL

## 2025-07-14 VITALS — DIASTOLIC BLOOD PRESSURE: 74 MMHG | BODY MASS INDEX: 32.74 KG/M2 | WEIGHT: 179 LBS | SYSTOLIC BLOOD PRESSURE: 124 MMHG

## 2025-07-14 DIAGNOSIS — O09.893 SHORT INTERVAL BETWEEN PREGNANCIES AFFECTING PREGNANCY IN THIRD TRIMESTER, ANTEPARTUM: Primary | ICD-10-CM

## 2025-07-14 NOTE — PROGRESS NOTES
Chief Complaint   Patient presents with    Routine Prenatal Visit     No Complaints/concerns        HPI: Meg is a  currently at 34w1d here for prenatal visit who reports the following:  Baby is active. She is doing well. She denies any ctxs. She does not want a tubal.                EXAM:     Vitals:    25 1115   BP: 124/74      Abdomen:   See prenatal flowsheet as noted and reviewed, soft, nontender   Pelvic:  See prenatal flowsheet as noted and reviewed   Urine:  See prenatal flowsheet as noted and reviewed    Lab Results   Component Value Date    ABO O 2025    RH Positive 2025    ABSCRN Negative 2025       MDM:  Impression: Short interval pregnancies   Tests done today: none   Topics discussed: kick counts and fetal movement   labor signs and symptoms  Will repeat growth scan next visit due to 76% growth @ 32 weeks   Reviewed OB labs   Tests next visit: U/S for EFW, repeat                RTO:                        2 weeks    This note was electronically signed.  America Michelle, APRN  2025

## 2025-07-28 ENCOUNTER — ROUTINE PRENATAL (OUTPATIENT)
Dept: OBSTETRICS AND GYNECOLOGY | Facility: CLINIC | Age: 25
End: 2025-07-28
Payer: COMMERCIAL

## 2025-07-28 VITALS — BODY MASS INDEX: 33.47 KG/M2 | WEIGHT: 183 LBS | DIASTOLIC BLOOD PRESSURE: 62 MMHG | SYSTOLIC BLOOD PRESSURE: 102 MMHG

## 2025-07-28 DIAGNOSIS — Z36.85 ANTENATAL SCREENING FOR STREPTOCOCCUS B: ICD-10-CM

## 2025-07-28 DIAGNOSIS — Z34.83 PRENATAL CARE, SUBSEQUENT PREGNANCY IN THIRD TRIMESTER: Primary | ICD-10-CM

## 2025-07-28 NOTE — PROGRESS NOTES
Prenatal Care Visit    Subjective   Chief Complaint   Patient presents with    Routine Prenatal Visit     Repeat growth and GBS done today. C/o excess swelling in her feet as well as some blurred vision/dizziness.        History:   Meg is a  currently at 36w1d who presents for a prenatal care visit today.    No major issues.    Social History    Tobacco Use      Smoking status: Never      Smokeless tobacco: Never       Objective   /62   Wt 83 kg (183 lb)   LMP 2024   BMI 33.47 kg/m²   Physical Exam:  Normal, gestational age-appropriate exam today        Plan   Medical Decision Making:    I have reviewed the prenatal labs and ultrasound(s) today. I have reviewed the most recent prenatal progress note(s).    Diagnosis: Supervision of low risk pregnancy  LGA   Tests/Orders/Rx today: Orders Placed This Encounter   Procedures    Strep Grp B ANDERSON + Reflex - Swab, Vaginal/Rectum     Release to patient:   Routine Release [[id number]]       Medication Management: None     Topics discussed: Prenatal care milestones  kick counts and fetal movement  PIH precautions   labor signs and symptoms  U/S findings   Tests next visit: none   Next visit: 1 week(s)     Nba Call MD  Obstetrics and Gynecology  [facility]

## 2025-07-30 LAB — GP B STREP DNA SPEC QL NAA+PROBE: NEGATIVE

## 2025-08-04 ENCOUNTER — ROUTINE PRENATAL (OUTPATIENT)
Dept: OBSTETRICS AND GYNECOLOGY | Facility: CLINIC | Age: 25
End: 2025-08-04
Payer: COMMERCIAL

## 2025-08-04 VITALS — WEIGHT: 187 LBS | DIASTOLIC BLOOD PRESSURE: 72 MMHG | SYSTOLIC BLOOD PRESSURE: 122 MMHG | BODY MASS INDEX: 34.2 KG/M2

## 2025-08-04 DIAGNOSIS — Z34.83 ENCOUNTER FOR SUPERVISION OF OTHER NORMAL PREGNANCY IN THIRD TRIMESTER: Primary | ICD-10-CM

## 2025-08-04 PROCEDURE — 99213 OFFICE O/P EST LOW 20 MIN: CPT | Performed by: MIDWIFE

## 2025-08-11 ENCOUNTER — ROUTINE PRENATAL (OUTPATIENT)
Dept: OBSTETRICS AND GYNECOLOGY | Facility: CLINIC | Age: 25
End: 2025-08-11
Payer: COMMERCIAL

## 2025-08-11 VITALS — DIASTOLIC BLOOD PRESSURE: 70 MMHG | BODY MASS INDEX: 34.39 KG/M2 | WEIGHT: 188 LBS | SYSTOLIC BLOOD PRESSURE: 114 MMHG

## 2025-08-11 DIAGNOSIS — Z3A.38 38 WEEKS GESTATION OF PREGNANCY: Primary | ICD-10-CM

## 2025-08-11 PROCEDURE — 99213 OFFICE O/P EST LOW 20 MIN: CPT | Performed by: MIDWIFE

## 2025-08-11 PROCEDURE — 99459 PELVIC EXAMINATION: CPT | Performed by: MIDWIFE

## 2025-08-15 ENCOUNTER — HOSPITAL ENCOUNTER (OUTPATIENT)
Facility: HOSPITAL | Age: 25
Discharge: HOME | End: 2025-08-15
Attending: OBSTETRICS & GYNECOLOGY | Admitting: OBSTETRICS & GYNECOLOGY
Payer: COMMERCIAL

## 2025-08-18 ENCOUNTER — PREP FOR SURGERY (OUTPATIENT)
Dept: OTHER | Facility: HOSPITAL | Age: 25
End: 2025-08-18
Payer: COMMERCIAL

## 2025-08-18 ENCOUNTER — HOSPITAL ENCOUNTER (OUTPATIENT)
Dept: LABOR AND DELIVERY | Facility: HOSPITAL | Age: 25
Discharge: HOME | End: 2025-08-18
Payer: COMMERCIAL

## 2025-08-18 ENCOUNTER — ROUTINE PRENATAL (OUTPATIENT)
Dept: OBSTETRICS AND GYNECOLOGY | Facility: CLINIC | Age: 25
End: 2025-08-18
Payer: COMMERCIAL

## 2025-08-18 ENCOUNTER — TELEPHONE (OUTPATIENT)
Dept: OBSTETRICS AND GYNECOLOGY | Facility: CLINIC | Age: 25
End: 2025-08-18
Payer: COMMERCIAL

## 2025-08-18 ENCOUNTER — HOSPITAL ENCOUNTER (INPATIENT)
Facility: HOSPITAL | Age: 25
LOS: 2 days | Discharge: HOME | End: 2025-08-20
Attending: OBSTETRICS & GYNECOLOGY | Admitting: MIDWIFE
Payer: COMMERCIAL

## 2025-08-18 VITALS — SYSTOLIC BLOOD PRESSURE: 118 MMHG | DIASTOLIC BLOOD PRESSURE: 68 MMHG | WEIGHT: 193 LBS | BODY MASS INDEX: 35.3 KG/M2

## 2025-08-18 DIAGNOSIS — Z3A.39 39 WEEKS GESTATION OF PREGNANCY: Primary | ICD-10-CM

## 2025-08-18 DIAGNOSIS — Z34.90 ENCOUNTER FOR INDUCTION OF LABOR: Primary | ICD-10-CM

## 2025-08-18 DIAGNOSIS — Z34.83 ENCOUNTER FOR SUPERVISION OF OTHER NORMAL PREGNANCY IN THIRD TRIMESTER: ICD-10-CM

## 2025-08-18 PROCEDURE — 99213 OFFICE O/P EST LOW 20 MIN: CPT | Performed by: MIDWIFE

## 2025-08-18 RX ORDER — SODIUM CHLORIDE 9 MG/ML
40 INJECTION, SOLUTION INTRAVENOUS AS NEEDED
Status: CANCELLED | OUTPATIENT
Start: 2025-08-18

## 2025-08-18 RX ORDER — LIDOCAINE HYDROCHLORIDE 10 MG/ML
0.5 INJECTION, SOLUTION INFILTRATION; PERINEURAL ONCE AS NEEDED
Status: CANCELLED | OUTPATIENT
Start: 2025-08-18

## 2025-08-18 RX ORDER — ONDANSETRON 4 MG/1
4 TABLET, ORALLY DISINTEGRATING ORAL EVERY 6 HOURS PRN
Status: CANCELLED | OUTPATIENT
Start: 2025-08-18

## 2025-08-18 RX ORDER — CARBOPROST TROMETHAMINE 250 UG/ML
250 INJECTION, SOLUTION INTRAMUSCULAR ONCE AS NEEDED
Status: CANCELLED | OUTPATIENT
Start: 2025-08-18 | End: 2025-08-19

## 2025-08-18 RX ORDER — ONDANSETRON 4 MG/1
4 TABLET, ORALLY DISINTEGRATING ORAL ONCE AS NEEDED
Status: CANCELLED | OUTPATIENT
Start: 2025-08-18

## 2025-08-18 RX ORDER — SODIUM CHLORIDE 0.9 % (FLUSH) 0.9 %
10 SYRINGE (ML) INJECTION AS NEEDED
Status: CANCELLED | OUTPATIENT
Start: 2025-08-18

## 2025-08-18 RX ORDER — PROMETHAZINE HYDROCHLORIDE 12.5 MG/1
12.5 TABLET ORAL EVERY 6 HOURS PRN
Status: CANCELLED | OUTPATIENT
Start: 2025-08-18

## 2025-08-18 RX ORDER — OXYTOCIN/0.9 % SODIUM CHLORIDE 30/500 ML
250 PLASTIC BAG, INJECTION (ML) INTRAVENOUS CONTINUOUS
Status: CANCELLED | OUTPATIENT
Start: 2025-08-18 | End: 2025-08-18

## 2025-08-18 RX ORDER — OXYTOCIN/0.9 % SODIUM CHLORIDE 30/500 ML
1-30 PLASTIC BAG, INJECTION (ML) INTRAVENOUS
Status: CANCELLED | OUTPATIENT
Start: 2025-08-18

## 2025-08-18 RX ORDER — HYDROCODONE BITARTRATE AND ACETAMINOPHEN 5; 325 MG/1; MG/1
1 TABLET ORAL EVERY 4 HOURS PRN
Refills: 0 | Status: CANCELLED | OUTPATIENT
Start: 2025-08-18 | End: 2025-08-25

## 2025-08-18 RX ORDER — PROMETHAZINE HYDROCHLORIDE 12.5 MG/1
12.5 SUPPOSITORY RECTAL EVERY 6 HOURS PRN
Status: CANCELLED | OUTPATIENT
Start: 2025-08-18

## 2025-08-18 RX ORDER — OXYTOCIN/0.9 % SODIUM CHLORIDE 30/500 ML
999 PLASTIC BAG, INJECTION (ML) INTRAVENOUS ONCE
Status: CANCELLED | OUTPATIENT
Start: 2025-08-18 | End: 2025-08-18

## 2025-08-18 RX ORDER — MISOPROSTOL 200 UG/1
800 TABLET ORAL AS NEEDED
Status: CANCELLED | OUTPATIENT
Start: 2025-08-18

## 2025-08-18 RX ORDER — SODIUM CHLORIDE, SODIUM LACTATE, POTASSIUM CHLORIDE, CALCIUM CHLORIDE 600; 310; 30; 20 MG/100ML; MG/100ML; MG/100ML; MG/100ML
125 INJECTION, SOLUTION INTRAVENOUS ONCE
Status: CANCELLED | OUTPATIENT
Start: 2025-08-18 | End: 2025-08-18

## 2025-08-18 RX ORDER — METHYLERGONOVINE MALEATE 0.2 MG/ML
200 INJECTION INTRAVENOUS ONCE AS NEEDED
Status: CANCELLED | OUTPATIENT
Start: 2025-08-18 | End: 2025-08-19

## 2025-08-18 RX ORDER — ACETAMINOPHEN 325 MG/1
650 TABLET ORAL ONCE AS NEEDED
Status: CANCELLED | OUTPATIENT
Start: 2025-08-18

## 2025-08-18 RX ORDER — MAGNESIUM CARB/ALUMINUM HYDROX 105-160MG
30 TABLET,CHEWABLE ORAL ONCE
Status: CANCELLED | OUTPATIENT
Start: 2025-08-18 | End: 2025-08-18

## 2025-08-18 RX ORDER — ACETAMINOPHEN 325 MG/1
650 TABLET ORAL EVERY 4 HOURS PRN
Status: CANCELLED | OUTPATIENT
Start: 2025-08-18

## 2025-08-18 RX ORDER — ONDANSETRON 2 MG/ML
4 INJECTION INTRAMUSCULAR; INTRAVENOUS ONCE AS NEEDED
Status: CANCELLED | OUTPATIENT
Start: 2025-08-18

## 2025-08-18 RX ORDER — ONDANSETRON 2 MG/ML
4 INJECTION INTRAMUSCULAR; INTRAVENOUS EVERY 6 HOURS PRN
Status: CANCELLED | OUTPATIENT
Start: 2025-08-18

## 2025-08-18 RX ORDER — SODIUM CHLORIDE 0.9 % (FLUSH) 0.9 %
10 SYRINGE (ML) INJECTION EVERY 12 HOURS SCHEDULED
Status: CANCELLED | OUTPATIENT
Start: 2025-08-18

## 2025-08-21 ENCOUNTER — MATERNAL SCREENING (OUTPATIENT)
Dept: CALL CENTER | Facility: HOSPITAL | Age: 25
End: 2025-08-21
Payer: COMMERCIAL

## 2025-08-29 ENCOUNTER — MATERNAL SCREENING (OUTPATIENT)
Dept: CALL CENTER | Facility: HOSPITAL | Age: 25
End: 2025-08-29
Payer: COMMERCIAL